# Patient Record
Sex: FEMALE | Race: WHITE | NOT HISPANIC OR LATINO | Employment: STUDENT | URBAN - METROPOLITAN AREA
[De-identification: names, ages, dates, MRNs, and addresses within clinical notes are randomized per-mention and may not be internally consistent; named-entity substitution may affect disease eponyms.]

---
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
## 2020-01-02 ENCOUNTER — PATIENT MESSAGE (OUTPATIENT)
Dept: OBSTETRICS AND GYNECOLOGY | Facility: CLINIC | Age: 20
End: 2020-01-02

## 2020-01-05 ENCOUNTER — PATIENT MESSAGE (OUTPATIENT)
Dept: HEMATOLOGY/ONCOLOGY | Facility: CLINIC | Age: 20
End: 2020-01-05

## 2020-01-07 ENCOUNTER — PATIENT MESSAGE (OUTPATIENT)
Dept: HEMATOLOGY/ONCOLOGY | Facility: CLINIC | Age: 20
End: 2020-01-07

## 2020-01-09 ENCOUNTER — PATIENT MESSAGE (OUTPATIENT)
Dept: OBSTETRICS AND GYNECOLOGY | Facility: CLINIC | Age: 20
End: 2020-01-09

## 2020-01-10 ENCOUNTER — TELEPHONE (OUTPATIENT)
Dept: OBSTETRICS AND GYNECOLOGY | Facility: CLINIC | Age: 20
End: 2020-01-10

## 2020-01-10 NOTE — TELEPHONE ENCOUNTER
Pt states she experienced a flow of blood last night and it's stopped without any notice of blood upon wake up. She has been advised to continue to monitor her symptoms and report to the ED if she begins to have a flow of blood with cramping. Pt verbalizes understanding. KATHRYN

## 2020-01-10 NOTE — TELEPHONE ENCOUNTER
----- Message from Nubia Luevano sent at 1/10/2020  7:56 AM CST -----  .Type:  Same Day Appointment Request    Caller is requesting a same day appointment.  Caller declined first available appointment listed below.    Name of Caller:SELF  When is the first available appointment?  Symptoms: LIGHT BLEEDING, MORE THAN USUAL  Best Call Back Number:.[phone] (home)   Additional Information: SPOKE WITH ON CALL LAST NIGHT & WAS TOLD SHE NEEDS TO SCHEDULE ULTRASOUND TODAY

## 2020-01-21 ENCOUNTER — LAB VISIT (OUTPATIENT)
Dept: LAB | Facility: HOSPITAL | Age: 20
End: 2020-01-21
Attending: INTERNAL MEDICINE
Payer: COMMERCIAL

## 2020-01-21 DIAGNOSIS — O99.281 THYROID DISEASE DURING PREGNANCY IN FIRST TRIMESTER: ICD-10-CM

## 2020-01-21 DIAGNOSIS — E07.9 THYROID DISEASE DURING PREGNANCY IN FIRST TRIMESTER: ICD-10-CM

## 2020-01-21 DIAGNOSIS — O99.011 ANEMIA AFFECTING PREGNANCY IN FIRST TRIMESTER: ICD-10-CM

## 2020-01-21 LAB
ALBUMIN SERPL BCP-MCNC: 3.9 G/DL (ref 3.5–5.2)
ALP SERPL-CCNC: 66 U/L (ref 55–135)
ALT SERPL W/O P-5'-P-CCNC: 21 U/L (ref 10–44)
ANION GAP SERPL CALC-SCNC: 10 MMOL/L (ref 8–16)
AST SERPL-CCNC: 19 U/L (ref 10–40)
BASOPHILS # BLD AUTO: 0.02 K/UL (ref 0–0.2)
BASOPHILS NFR BLD: 0.4 % (ref 0–1.9)
BILIRUB SERPL-MCNC: 0.5 MG/DL (ref 0.1–1)
BUN SERPL-MCNC: 9 MG/DL (ref 6–20)
CALCIUM SERPL-MCNC: 9.2 MG/DL (ref 8.7–10.5)
CHLORIDE SERPL-SCNC: 106 MMOL/L (ref 95–110)
CO2 SERPL-SCNC: 21 MMOL/L (ref 23–29)
CREAT SERPL-MCNC: 0.6 MG/DL (ref 0.5–1.4)
DIFFERENTIAL METHOD: ABNORMAL
EOSINOPHIL # BLD AUTO: 0 K/UL (ref 0–0.5)
EOSINOPHIL NFR BLD: 0 % (ref 0–8)
ERYTHROCYTE [DISTWIDTH] IN BLOOD BY AUTOMATED COUNT: 16.6 % (ref 11.5–14.5)
EST. GFR  (AFRICAN AMERICAN): >60 ML/MIN/1.73 M^2
EST. GFR  (NON AFRICAN AMERICAN): >60 ML/MIN/1.73 M^2
GLUCOSE SERPL-MCNC: 60 MG/DL (ref 70–110)
HCT VFR BLD AUTO: 33.9 % (ref 37–48.5)
HGB BLD-MCNC: 10.7 G/DL (ref 12–16)
IMM GRANULOCYTES # BLD AUTO: 0.02 K/UL (ref 0–0.04)
IMM GRANULOCYTES NFR BLD AUTO: 0.4 % (ref 0–0.5)
LYMPHOCYTES # BLD AUTO: 1.8 K/UL (ref 1–4.8)
LYMPHOCYTES NFR BLD: 40.2 % (ref 18–48)
MCH RBC QN AUTO: 28.5 PG (ref 27–31)
MCHC RBC AUTO-ENTMCNC: 31.6 G/DL (ref 32–36)
MCV RBC AUTO: 90 FL (ref 82–98)
MONOCYTES # BLD AUTO: 0.3 K/UL (ref 0.3–1)
MONOCYTES NFR BLD: 6.6 % (ref 4–15)
NEUTROPHILS # BLD AUTO: 2.4 K/UL (ref 1.8–7.7)
NEUTROPHILS NFR BLD: 52.4 % (ref 38–73)
NRBC BLD-RTO: 0 /100 WBC
PLATELET # BLD AUTO: 169 K/UL (ref 150–350)
PMV BLD AUTO: 8.9 FL (ref 9.2–12.9)
POTASSIUM SERPL-SCNC: 3.9 MMOL/L (ref 3.5–5.1)
PROT SERPL-MCNC: 7.5 G/DL (ref 6–8.4)
RBC # BLD AUTO: 3.76 M/UL (ref 4–5.4)
SODIUM SERPL-SCNC: 137 MMOL/L (ref 136–145)
T4 FREE SERPL-MCNC: 1.24 NG/DL (ref 0.71–1.51)
TSH SERPL DL<=0.005 MIU/L-ACNC: 5.65 UIU/ML (ref 0.4–4)
WBC # BLD AUTO: 4.53 K/UL (ref 3.9–12.7)

## 2020-01-21 PROCEDURE — 84436 ASSAY OF TOTAL THYROXINE: CPT

## 2020-01-21 PROCEDURE — 84443 ASSAY THYROID STIM HORMONE: CPT

## 2020-01-21 PROCEDURE — 80053 COMPREHEN METABOLIC PANEL: CPT

## 2020-01-21 PROCEDURE — 84480 ASSAY TRIIODOTHYRONINE (T3): CPT

## 2020-01-21 PROCEDURE — 84439 ASSAY OF FREE THYROXINE: CPT

## 2020-01-21 PROCEDURE — 82746 ASSAY OF FOLIC ACID SERUM: CPT

## 2020-01-21 PROCEDURE — 83540 ASSAY OF IRON: CPT

## 2020-01-21 PROCEDURE — 82728 ASSAY OF FERRITIN: CPT

## 2020-01-21 PROCEDURE — 82607 VITAMIN B-12: CPT

## 2020-01-21 PROCEDURE — 36415 COLL VENOUS BLD VENIPUNCTURE: CPT

## 2020-01-22 DIAGNOSIS — O99.281 THYROID DISEASE DURING PREGNANCY IN FIRST TRIMESTER: Primary | ICD-10-CM

## 2020-01-22 DIAGNOSIS — E07.9 THYROID DISEASE DURING PREGNANCY IN FIRST TRIMESTER: Primary | ICD-10-CM

## 2020-01-22 LAB
FERRITIN SERPL-MCNC: 92 NG/ML (ref 20–300)
FOLATE SERPL-MCNC: 16.5 NG/ML (ref 4–24)
IRON SERPL-MCNC: 57 UG/DL (ref 30–160)
SATURATED IRON: 17 % (ref 20–50)
T3 SERPL-MCNC: 87 NG/DL (ref 60–180)
T4 SERPL-MCNC: 13.9 UG/DL (ref 4.5–11.5)
TOTAL IRON BINDING CAPACITY: 333 UG/DL (ref 250–450)
TRANSFERRIN SERPL-MCNC: 225 MG/DL (ref 200–375)
VIT B12 SERPL-MCNC: 237 PG/ML (ref 210–950)

## 2020-02-10 ENCOUNTER — PATIENT MESSAGE (OUTPATIENT)
Dept: HEMATOLOGY/ONCOLOGY | Facility: CLINIC | Age: 20
End: 2020-02-10

## 2020-08-23 NOTE — PROGRESS NOTES
Subjective:       Patient ID: Lillie Starr is a 19 y.o. female.    Chief Complaint: Other iron deficiency anemia [D50.8]  HPI: We have an opportunity to see Ms. Lillie Starr in Hematology Oncology clinic at [facility] on 08/23/2020.  Ms. Lillie Starr is a 19 y.o. woman with Grave's disease s/p thyroidectomy, heavy menses and long term anemia previously on oral iron and could not tolerate.  Presents for evaluation of microcytic anemia.  Reports has MATHEWS.  Denies chest pain.  Denies any bleeding in stools and urine. Has been seen by Dr. Machado in gynecology and tried on OCP for menorrhagia but cannot tolerate.     She is s/p transfusion with 2 units RBC, and iv venofer.  Reports significantly improved energy.     Delivered healthy baby 3 weeks ago.  Has vaginal bleeding, managed by OBGYN.       Oncology History    No history exists.     Past Medical History:   Diagnosis Date    Anemia     Graves disease     Graves disease     Scoliosis      No family history on file.  Social History     Socioeconomic History    Marital status: Single     Spouse name: Not on file    Number of children: Not on file    Years of education: Not on file    Highest education level: Not on file   Occupational History    Not on file   Social Needs    Financial resource strain: Not on file    Food insecurity     Worry: Not on file     Inability: Not on file    Transportation needs     Medical: Not on file     Non-medical: Not on file   Tobacco Use    Smoking status: Never Smoker    Smokeless tobacco: Never Used   Substance and Sexual Activity    Alcohol use: No    Drug use: No    Sexual activity: Yes     Partners: Male   Lifestyle    Physical activity     Days per week: Not on file     Minutes per session: Not on file    Stress: Not on file   Relationships    Social connections     Talks on phone: Not on file     Gets together: Not on file     Attends [employer] service: Not on file      Active member of club or organization: Not on file     Attends meetings of clubs or organizations: Not on file     Relationship status: Not on file   Other Topics Concern    Not on file   Social History Narrative    Not on file     Past Surgical History:   Procedure Laterality Date    DILATION AND CURETTAGE OF UTERUS  03/19/2018    iron infusion      SCOLIOSIS REPAIR  05/19/2017    THYROID SURGERY      TYMPANOSTOMY TUBE PLACEMENT       Current Outpatient Medications   Medication Sig Dispense Refill    albuterol (PROAIR HFA) 90 mcg/actuation inhaler Inhale 2 puffs into the lungs every 4 (four) hours as needed for Shortness of Breath. Rescue 1 Inhaler 2    levothyroxine (SYNTHROID) 125 MCG tablet Take 1 tablet (125 mcg total) by mouth once daily. 30 tablet 1    levothyroxine (SYNTHROID, LEVOTHROID) 175 MCG tablet Take 1 tablet (175 mcg total) by mouth once daily. 90 tablet 3    prenatal vit,sharmila 74/iron/folic (PRENATAL VITAMIN 1+1 ORAL) Take by mouth.       No current facility-administered medications for this visit.        Labs:  Lab Results   Component Value Date    WBC 4.53 01/21/2020    HGB 10.7 (L) 01/21/2020    HCT 33.9 (L) 01/21/2020    MCV 90 01/21/2020     01/21/2020     BMP  Lab Results   Component Value Date     01/21/2020    K 3.9 01/21/2020     01/21/2020    CO2 21 (L) 01/21/2020    BUN 9 01/21/2020    CREATININE 0.6 01/21/2020    CALCIUM 9.2 01/21/2020    ANIONGAP 10 01/21/2020    ESTGFRAFRICA >60 01/21/2020    EGFRNONAA >60 01/21/2020     Lab Results   Component Value Date    ALT 21 01/21/2020    AST 19 01/21/2020    ALKPHOS 66 01/21/2020    BILITOT 0.5 01/21/2020       Lab Results   Component Value Date    IRON 57 01/21/2020    TIBC 333 01/21/2020    FERRITIN 92 01/21/2020     Lab Results   Component Value Date    EPVYGZBA23 237 01/21/2020     Lab Results   Component Value Date    FOLATE 16.5 01/21/2020     Lab Results   Component Value Date    TSH 5.649 (H) 01/21/2020        I have reviewed the radiology reports and examined the scan/xray images.    Review of Systems   Constitutional: Negative.    HENT: Negative.    Eyes: Negative.    Respiratory: Negative.    Cardiovascular: Negative.    Gastrointestinal: Negative.    Endocrine: Negative.    Genitourinary: Negative.    Musculoskeletal: Negative.    Skin: Negative.    Allergic/Immunologic: Negative.    Neurological: Negative.    Hematological: Negative.    Psychiatric/Behavioral: Negative.      ECOG SCORE              Objective:   There were no vitals filed for this visit.There is no height or weight on file to calculate BMI.  Physical Exam  Vitals signs and nursing note reviewed.   Constitutional:       Appearance: She is well-developed.   HENT:      Head: Normocephalic and atraumatic.   Eyes:      Conjunctiva/sclera: Conjunctivae normal.   Neck:      Musculoskeletal: Normal range of motion and neck supple.   Cardiovascular:      Rate and Rhythm: Normal rate and regular rhythm.   Pulmonary:      Effort: Pulmonary effort is normal.      Breath sounds: Normal breath sounds.   Abdominal:      General: Bowel sounds are normal.      Palpations: Abdomen is soft.   Musculoskeletal: Normal range of motion.   Skin:     General: Skin is warm and dry.   Neurological:      Mental Status: She is alert and oriented to person, place, and time.   Psychiatric:         Behavior: Behavior normal.         Thought Content: Thought content normal.         Judgment: Judgment normal.           Assessment:      1. Other iron deficiency anemia    2. Hypothyroid   3. Nutritional anemia           Plan:     Other iron deficiency anemia  Will give iv venofer if iron deficient    Hypothyroid  -     Ambulatory referral/consult to Endocrinology; Future; Expected date: 08/31/2020  -     TSH; Future; Expected date: 08/24/2020  -     T3, free; Future; Expected date: 08/24/2020  -     T4, free; Future; Expected date: 08/24/2020    Nutritional anemia  -     CBC auto  differential; Future; Expected date: 08/24/2020  -     Comprehensive metabolic panel; Future; Expected date: 08/24/2020  -     Ferritin; Future; Expected date: 08/24/2020  -     Iron and TIBC; Future; Expected date: 08/24/2020  -     Vitamin B12; Future; Expected date: 08/24/2020  -     Folate; Future; Expected date: 08/24/2020

## 2020-08-24 ENCOUNTER — OFFICE VISIT (OUTPATIENT)
Dept: HEMATOLOGY/ONCOLOGY | Facility: CLINIC | Age: 20
End: 2020-08-24
Payer: COMMERCIAL

## 2020-08-24 ENCOUNTER — LAB VISIT (OUTPATIENT)
Dept: LAB | Facility: HOSPITAL | Age: 20
End: 2020-08-24
Attending: INTERNAL MEDICINE
Payer: COMMERCIAL

## 2020-08-24 VITALS
BODY MASS INDEX: 21.19 KG/M2 | DIASTOLIC BLOOD PRESSURE: 69 MMHG | OXYGEN SATURATION: 98 % | SYSTOLIC BLOOD PRESSURE: 96 MMHG | WEIGHT: 127.19 LBS | HEIGHT: 65 IN | RESPIRATION RATE: 18 BRPM | TEMPERATURE: 97 F | HEART RATE: 110 BPM

## 2020-08-24 DIAGNOSIS — E07.9 THYROID DISEASE DURING PREGNANCY IN FIRST TRIMESTER: ICD-10-CM

## 2020-08-24 DIAGNOSIS — D53.9 NUTRITIONAL ANEMIA: ICD-10-CM

## 2020-08-24 DIAGNOSIS — D50.8 OTHER IRON DEFICIENCY ANEMIA: Primary | ICD-10-CM

## 2020-08-24 DIAGNOSIS — O99.281 THYROID DISEASE DURING PREGNANCY IN FIRST TRIMESTER: ICD-10-CM

## 2020-08-24 LAB
ALBUMIN SERPL BCP-MCNC: 4 G/DL (ref 3.5–5.2)
ALP SERPL-CCNC: 120 U/L (ref 55–135)
ALT SERPL W/O P-5'-P-CCNC: 17 U/L (ref 10–44)
ANION GAP SERPL CALC-SCNC: 10 MMOL/L (ref 8–16)
AST SERPL-CCNC: 19 U/L (ref 10–40)
BASOPHILS # BLD AUTO: 0.02 K/UL (ref 0–0.2)
BASOPHILS NFR BLD: 0.4 % (ref 0–1.9)
BILIRUB SERPL-MCNC: 0.4 MG/DL (ref 0.1–1)
BUN SERPL-MCNC: 10 MG/DL (ref 6–20)
CALCIUM SERPL-MCNC: 9 MG/DL (ref 8.7–10.5)
CHLORIDE SERPL-SCNC: 106 MMOL/L (ref 95–110)
CO2 SERPL-SCNC: 25 MMOL/L (ref 23–29)
CREAT SERPL-MCNC: 0.7 MG/DL (ref 0.5–1.4)
DIFFERENTIAL METHOD: ABNORMAL
EOSINOPHIL # BLD AUTO: 0 K/UL (ref 0–0.5)
EOSINOPHIL NFR BLD: 0.2 % (ref 0–8)
ERYTHROCYTE [DISTWIDTH] IN BLOOD BY AUTOMATED COUNT: 16.7 % (ref 11.5–14.5)
EST. GFR  (AFRICAN AMERICAN): >60 ML/MIN/1.73 M^2
EST. GFR  (NON AFRICAN AMERICAN): >60 ML/MIN/1.73 M^2
FERRITIN SERPL-MCNC: 12 NG/ML (ref 20–300)
GLUCOSE SERPL-MCNC: 71 MG/DL (ref 70–110)
HCT VFR BLD AUTO: 38.9 % (ref 37–48.5)
HGB BLD-MCNC: 11.4 G/DL (ref 12–16)
IMM GRANULOCYTES # BLD AUTO: 0.01 K/UL (ref 0–0.04)
IMM GRANULOCYTES NFR BLD AUTO: 0.2 % (ref 0–0.5)
IRON SERPL-MCNC: 34 UG/DL (ref 30–160)
LYMPHOCYTES # BLD AUTO: 1.7 K/UL (ref 1–4.8)
LYMPHOCYTES NFR BLD: 32.8 % (ref 18–48)
MCH RBC QN AUTO: 21.3 PG (ref 27–31)
MCHC RBC AUTO-ENTMCNC: 29.3 G/DL (ref 32–36)
MCV RBC AUTO: 73 FL (ref 82–98)
MONOCYTES # BLD AUTO: 0.4 K/UL (ref 0.3–1)
MONOCYTES NFR BLD: 7.1 % (ref 4–15)
NEUTROPHILS # BLD AUTO: 3 K/UL (ref 1.8–7.7)
NEUTROPHILS NFR BLD: 59.3 % (ref 38–73)
NRBC BLD-RTO: 0 /100 WBC
PLATELET # BLD AUTO: 315 K/UL (ref 150–350)
PMV BLD AUTO: 9.3 FL (ref 9.2–12.9)
POTASSIUM SERPL-SCNC: 4 MMOL/L (ref 3.5–5.1)
PROT SERPL-MCNC: 7.7 G/DL (ref 6–8.4)
RBC # BLD AUTO: 5.35 M/UL (ref 4–5.4)
SATURATED IRON: 6 % (ref 20–50)
SODIUM SERPL-SCNC: 141 MMOL/L (ref 136–145)
T4 FREE SERPL-MCNC: 1.73 NG/DL (ref 0.71–1.51)
TOTAL IRON BINDING CAPACITY: 564 UG/DL (ref 250–450)
TRANSFERRIN SERPL-MCNC: 381 MG/DL (ref 200–375)
TSH SERPL DL<=0.005 MIU/L-ACNC: 0.03 UIU/ML (ref 0.4–4)
WBC # BLD AUTO: 5.09 K/UL (ref 3.9–12.7)

## 2020-08-24 PROCEDURE — 82607 VITAMIN B-12: CPT

## 2020-08-24 PROCEDURE — 99214 PR OFFICE/OUTPT VISIT, EST, LEVL IV, 30-39 MIN: ICD-10-PCS | Mod: S$GLB,,, | Performed by: INTERNAL MEDICINE

## 2020-08-24 PROCEDURE — 84439 ASSAY OF FREE THYROXINE: CPT

## 2020-08-24 PROCEDURE — 85025 COMPLETE CBC W/AUTO DIFF WBC: CPT

## 2020-08-24 PROCEDURE — 80053 COMPREHEN METABOLIC PANEL: CPT

## 2020-08-24 PROCEDURE — 83540 ASSAY OF IRON: CPT

## 2020-08-24 PROCEDURE — 3008F BODY MASS INDEX DOCD: CPT | Mod: CPTII,S$GLB,, | Performed by: INTERNAL MEDICINE

## 2020-08-24 PROCEDURE — 82728 ASSAY OF FERRITIN: CPT

## 2020-08-24 PROCEDURE — 99999 PR PBB SHADOW E&M-EST. PATIENT-LVL III: CPT | Mod: PBBFAC,,, | Performed by: INTERNAL MEDICINE

## 2020-08-24 PROCEDURE — 36415 COLL VENOUS BLD VENIPUNCTURE: CPT

## 2020-08-24 PROCEDURE — 99999 PR PBB SHADOW E&M-EST. PATIENT-LVL III: ICD-10-PCS | Mod: PBBFAC,,, | Performed by: INTERNAL MEDICINE

## 2020-08-24 PROCEDURE — 84443 ASSAY THYROID STIM HORMONE: CPT

## 2020-08-24 PROCEDURE — 99214 OFFICE O/P EST MOD 30 MIN: CPT | Mod: S$GLB,,, | Performed by: INTERNAL MEDICINE

## 2020-08-24 PROCEDURE — 84481 FREE ASSAY (FT-3): CPT

## 2020-08-24 PROCEDURE — 3008F PR BODY MASS INDEX (BMI) DOCUMENTED: ICD-10-PCS | Mod: CPTII,S$GLB,, | Performed by: INTERNAL MEDICINE

## 2020-08-24 PROCEDURE — 82746 ASSAY OF FOLIC ACID SERUM: CPT

## 2020-08-25 LAB
FOLATE SERPL-MCNC: 17 NG/ML (ref 4–24)
T3FREE SERPL-MCNC: 4.6 PG/ML (ref 2.3–4.2)
VIT B12 SERPL-MCNC: 402 PG/ML (ref 210–950)

## 2020-08-25 RX ORDER — SODIUM CHLORIDE 0.9 % (FLUSH) 0.9 %
10 SYRINGE (ML) INJECTION
Status: CANCELLED | OUTPATIENT
Start: 2020-10-24

## 2020-08-25 RX ORDER — HEPARIN 100 UNIT/ML
500 SYRINGE INTRAVENOUS
Status: CANCELLED | OUTPATIENT
Start: 2020-10-03

## 2020-08-25 RX ORDER — SODIUM CHLORIDE 0.9 % (FLUSH) 0.9 %
10 SYRINGE (ML) INJECTION
Status: CANCELLED | OUTPATIENT
Start: 2020-09-12

## 2020-08-25 RX ORDER — HEPARIN 100 UNIT/ML
500 SYRINGE INTRAVENOUS
Status: CANCELLED | OUTPATIENT
Start: 2020-10-10

## 2020-08-25 RX ORDER — HEPARIN 100 UNIT/ML
500 SYRINGE INTRAVENOUS
Status: CANCELLED | OUTPATIENT
Start: 2020-09-26

## 2020-08-25 RX ORDER — HEPARIN 100 UNIT/ML
500 SYRINGE INTRAVENOUS
Status: CANCELLED | OUTPATIENT
Start: 2020-09-12

## 2020-08-25 RX ORDER — SODIUM CHLORIDE 0.9 % (FLUSH) 0.9 %
10 SYRINGE (ML) INJECTION
Status: CANCELLED | OUTPATIENT
Start: 2020-09-26

## 2020-08-25 RX ORDER — HEPARIN 100 UNIT/ML
500 SYRINGE INTRAVENOUS
Status: CANCELLED | OUTPATIENT
Start: 2020-10-17

## 2020-08-25 RX ORDER — HEPARIN 100 UNIT/ML
500 SYRINGE INTRAVENOUS
Status: CANCELLED | OUTPATIENT
Start: 2020-09-19

## 2020-08-25 RX ORDER — SODIUM CHLORIDE 0.9 % (FLUSH) 0.9 %
10 SYRINGE (ML) INJECTION
Status: CANCELLED | OUTPATIENT
Start: 2020-10-03

## 2020-08-25 RX ORDER — SODIUM CHLORIDE 0.9 % (FLUSH) 0.9 %
10 SYRINGE (ML) INJECTION
Status: CANCELLED | OUTPATIENT
Start: 2020-10-17

## 2020-08-25 RX ORDER — SODIUM CHLORIDE 0.9 % (FLUSH) 0.9 %
10 SYRINGE (ML) INJECTION
Status: CANCELLED | OUTPATIENT
Start: 2020-10-10

## 2020-08-25 RX ORDER — HEPARIN 100 UNIT/ML
500 SYRINGE INTRAVENOUS
Status: CANCELLED | OUTPATIENT
Start: 2020-10-24

## 2020-08-25 RX ORDER — HEPARIN 100 UNIT/ML
500 SYRINGE INTRAVENOUS
Status: CANCELLED | OUTPATIENT
Start: 2020-08-25

## 2020-08-25 RX ORDER — SODIUM CHLORIDE 0.9 % (FLUSH) 0.9 %
10 SYRINGE (ML) INJECTION
Status: CANCELLED | OUTPATIENT
Start: 2020-08-25

## 2020-08-25 RX ORDER — SODIUM CHLORIDE 0.9 % (FLUSH) 0.9 %
10 SYRINGE (ML) INJECTION
Status: CANCELLED | OUTPATIENT
Start: 2020-09-19

## 2020-09-11 ENCOUNTER — INFUSION (OUTPATIENT)
Dept: INFUSION THERAPY | Facility: HOSPITAL | Age: 20
End: 2020-09-11
Attending: INTERNAL MEDICINE
Payer: COMMERCIAL

## 2020-09-11 VITALS
BODY MASS INDEX: 21.12 KG/M2 | RESPIRATION RATE: 16 BRPM | HEIGHT: 65 IN | DIASTOLIC BLOOD PRESSURE: 64 MMHG | SYSTOLIC BLOOD PRESSURE: 99 MMHG | HEART RATE: 77 BPM | TEMPERATURE: 97 F | OXYGEN SATURATION: 100 % | WEIGHT: 126.75 LBS

## 2020-09-11 DIAGNOSIS — D50.8 IRON DEFICIENCY ANEMIA SECONDARY TO INADEQUATE DIETARY IRON INTAKE: Primary | ICD-10-CM

## 2020-09-11 PROCEDURE — 25000003 PHARM REV CODE 250: Performed by: INTERNAL MEDICINE

## 2020-09-11 PROCEDURE — 96365 THER/PROPH/DIAG IV INF INIT: CPT

## 2020-09-11 PROCEDURE — 63600175 PHARM REV CODE 636 W HCPCS: Performed by: INTERNAL MEDICINE

## 2020-09-11 RX ORDER — SODIUM CHLORIDE 0.9 % (FLUSH) 0.9 %
10 SYRINGE (ML) INJECTION
Status: DISCONTINUED | OUTPATIENT
Start: 2020-09-11 | End: 2020-09-11 | Stop reason: HOSPADM

## 2020-09-11 RX ADMIN — SODIUM CHLORIDE: 0.9 INJECTION, SOLUTION INTRAVENOUS at 01:09

## 2020-09-11 RX ADMIN — SODIUM CHLORIDE: 9 INJECTION, SOLUTION INTRAVENOUS at 01:09

## 2020-09-11 NOTE — DISCHARGE INSTRUCTIONS
[facility]  [address] [facility]  [address]  [phone] phone     [phone] fax  Hours of Operation: Monday- Friday 8:00am- 5:00pm  After hours phone  [phone]  Hematology / Oncology Physicians on call      KAZ [address] Dr[address] Dr., Dr., Dr., NP Sydney Prescott, NP Tyesha Taylor, NP    Please call with any concerns regarding your appointment today.  IRON DEFICIENCY ANEMIA:  Anemia is a condition where the size or number of red blood cells in the body is reduced. Iron is needed in the diet to make red cells. The red blood cells carry oxygen to all parts of the body. Anemia limits the delivery of oxygen to where it is needed. This causes a feeling of being tired and run down. When anemia becomes severe, the skin becomes pale and there is shortness of breath with exertion, headaches, dizziness, drowsiness and fatigue.  The cause of your anemia is lack of iron in your body. This may occur due to blood loss (for example, heavy menstrual periods or bleeding from the stomach or intestines) or a poor diet (not eating enough iron-containing foods), inability to absorb iron from your diet, or pregnancy.  If the blood count is low enough, an IRON SUPPLEMENT will be prescribed. It usually takes about 2-3 months of treatment with iron supplements to correct an anemia. Severe cases of anemia requires a blood transfusion to rapidly correct symptoms and deliver more oxygen to the cells.  HOME CARE:  1) Increase the iron stores in your body by eating foods high in iron content. This is a natural way of building your blood cells back up again. Beef, liver, spinach and other dark green leafy vegetables, whole grain products, beans and nuts are all natural sources of iron.  2) If you are having symptoms of anemia listed above:  -- Do not overexert yourself.  -- Talk to your doctor before flying on an airplane or  travelling to high altitudes.  FOLLOW UP with your doctor in 2 months for a repeat red blood cell count, or as recommended by our staff, to be sure that the anemia has been corrected.  GET PROMPT MEDICAL ATTENTION if any of the following occur or worsen:  -- Shortness of breath or chest pain  -- Dizziness or fainting  -- Vomiting blood or passing red or black-colored stool  © 6091-1798 [employer], [address] [place] [address], [place] [address]. All rights reserved. This information is not intended as a substitute for professional medical care. Always follow your healthcare professional's instructions.  WAYS TO HELP PREVENT INFECTION         WASH YOUR HANDS OFTEN DURING THE DAY, ESPECIALLY BEFORE YOU EAT, AFTER USING THE BATHROOM, AND AFTER TOUCHING ANIMALS     STAY AWAY FROM PEOPLE WHO HAVE ILLNESSES YOU CAN CATCH; SUCH AS COLDS, FLU, CHICKEN POX     TRY TO AVOID CROWDS     STAY AWAY FROM CHILDREN WHO RECENTLY HAVE RECEIVED LIVE VIRUS VACCINES     MAINTAIN GOOD MOUTH CARE     DO NOT SQUEEZE OR SCRATCH PIMPLES     CLEAN CUTS & SCRAPES RIGHT AWAY AND DAILY UNTIL HEALED WITH WARM WATER, SOAP & AN ANTISEPTIC     AVOID CONTACT WITH LITTER BOXES, BIRD CAGES, & FISH TANKS     AVOID STANDING WATER, IE., BIRD BATHS, FLOWER POTS/VASES, OR HUMIDIFIERS     WEAR GLOVES WHEN GARDENING OR CLEANING UP AFTER OTHERS, ESPECIALLY BABIES & SMALL CHILDREN     DO NOT EAT RAW FISH, SEAFOOD, MEAT, OR EGGS  FALL PREVENTION   Falls often occur due to slipping, tripping or losing your balance. Here are ways to reduce your risk of falling again.   Was there anything that caused your fall that can be fixed, removed or replaced?   Make your home safe by keeping walkways clear of objects you may trip over.   Use non-slip pads under rugs.   Do not walk in poorly lit areas.   Do not stand on chairs or wobbly ladders.   Use caution when reaching overhead or looking upward. This position can cause a loss of balance.   Be sure your shoes  fit properly, have non-slip bottoms and are in good condition.   Be cautious when going up and down stairs, curbs, and when walking on uneven sidewalks.   If your balance is poor, consider using a cane or walker.   If your fall was related to alcohol use, stop or limit alcohol intake.   If your fall was related to use of sleeping medicines, talk to your doctor about this. You may need to reduce your dosage at bedtime if you awaken during the night to go to the bathroom.   To reduce the need for nighttime bathroom trips:   Avoid drinking fluids for several hours before going to bed   Empty your bladder before going to bed   Men can keep a urinal at the bedside   © 8760-3042 [facility], [address] [place] [address], [place] [address]. All rights reserved. This information is not intended as a substitute for professional medical care. Always follow your healthcare professional's instructions.

## 2020-09-11 NOTE — NURSING
Venofer ran for an hour - unable to chart on the mar.  Patient tolerate treatment today without adverse reaction. Follow up appointments made.

## 2020-09-11 NOTE — PLAN OF CARE
Problem: Adult Inpatient Plan of Care  Goal: Plan of Care Review  Outcome: Ongoing, Progressing  Goal: Patient-Specific Goal (Individualization)  Outcome: Ongoing, Progressing  Flowsheets (Taken 9/11/2020 1330)  Individualized Care Needs: feet elevated, warm blanket, and apple juice - request dairy free snacks  Anxieties, Fears or Concerns: none voiced  Patient-Specific Goals (Include Timeframe): To tolerate iron today  Goal: Absence of Hospital-Acquired Illness or Injury  Outcome: Ongoing, Progressing  Goal: Optimal Comfort and Wellbeing  Outcome: Ongoing, Progressing  Goal: Readiness for Transition of Care  Outcome: Ongoing, Progressing  Goal: Rounds/Family Conference  Outcome: Ongoing, Progressing     Problem: Anemia  Goal: Anemia Symptom Improvement  Outcome: Ongoing, Progressing   Patient reports, feeling tired today

## 2020-09-17 ENCOUNTER — INFUSION (OUTPATIENT)
Dept: INFUSION THERAPY | Facility: HOSPITAL | Age: 20
End: 2020-09-17
Attending: INTERNAL MEDICINE
Payer: COMMERCIAL

## 2020-09-17 VITALS
HEART RATE: 100 BPM | TEMPERATURE: 98 F | SYSTOLIC BLOOD PRESSURE: 98 MMHG | RESPIRATION RATE: 16 BRPM | DIASTOLIC BLOOD PRESSURE: 80 MMHG | OXYGEN SATURATION: 100 %

## 2020-09-17 DIAGNOSIS — D50.8 IRON DEFICIENCY ANEMIA SECONDARY TO INADEQUATE DIETARY IRON INTAKE: Primary | ICD-10-CM

## 2020-09-17 PROCEDURE — 63600175 PHARM REV CODE 636 W HCPCS: Performed by: INTERNAL MEDICINE

## 2020-09-17 PROCEDURE — 25000003 PHARM REV CODE 250: Performed by: INTERNAL MEDICINE

## 2020-09-17 PROCEDURE — 96365 THER/PROPH/DIAG IV INF INIT: CPT

## 2020-09-17 RX ADMIN — SODIUM CHLORIDE: 9 INJECTION, SOLUTION INTRAVENOUS at 02:09

## 2020-09-24 ENCOUNTER — OFFICE VISIT (OUTPATIENT)
Dept: ENDOCRINOLOGY | Facility: CLINIC | Age: 20
End: 2020-09-24
Payer: COMMERCIAL

## 2020-09-24 VITALS
RESPIRATION RATE: 16 BRPM | WEIGHT: 125.69 LBS | HEART RATE: 86 BPM | BODY MASS INDEX: 20.94 KG/M2 | DIASTOLIC BLOOD PRESSURE: 62 MMHG | SYSTOLIC BLOOD PRESSURE: 94 MMHG | HEIGHT: 65 IN

## 2020-09-24 DIAGNOSIS — E03.9 HYPOTHYROIDISM (ACQUIRED): ICD-10-CM

## 2020-09-24 DIAGNOSIS — O99.281 THYROID DISEASE DURING PREGNANCY IN FIRST TRIMESTER: ICD-10-CM

## 2020-09-24 DIAGNOSIS — E05.90 HYPERTHYROIDISM: Primary | ICD-10-CM

## 2020-09-24 DIAGNOSIS — E07.9 THYROID DISEASE DURING PREGNANCY IN FIRST TRIMESTER: ICD-10-CM

## 2020-09-24 DIAGNOSIS — R94.6 ABNORMAL THYROID FUNCTION TEST: ICD-10-CM

## 2020-09-24 PROCEDURE — 99204 PR OFFICE/OUTPT VISIT, NEW, LEVL IV, 45-59 MIN: ICD-10-PCS | Mod: S$GLB,,, | Performed by: INTERNAL MEDICINE

## 2020-09-24 PROCEDURE — 99999 PR PBB SHADOW E&M-EST. PATIENT-LVL III: CPT | Mod: PBBFAC,,, | Performed by: INTERNAL MEDICINE

## 2020-09-24 PROCEDURE — 99204 OFFICE O/P NEW MOD 45 MIN: CPT | Mod: S$GLB,,, | Performed by: INTERNAL MEDICINE

## 2020-09-24 PROCEDURE — 99999 PR PBB SHADOW E&M-EST. PATIENT-LVL III: ICD-10-PCS | Mod: PBBFAC,,, | Performed by: INTERNAL MEDICINE

## 2020-09-24 PROCEDURE — 3008F PR BODY MASS INDEX (BMI) DOCUMENTED: ICD-10-PCS | Mod: CPTII,S$GLB,, | Performed by: INTERNAL MEDICINE

## 2020-09-24 PROCEDURE — 3008F BODY MASS INDEX DOCD: CPT | Mod: CPTII,S$GLB,, | Performed by: INTERNAL MEDICINE

## 2020-09-24 RX ORDER — LEVOTHYROXINE SODIUM 150 UG/1
150 TABLET ORAL
Qty: 30 TABLET | Refills: 1 | Status: SHIPPED | OUTPATIENT
Start: 2020-09-24 | End: 2020-12-23 | Stop reason: SDUPTHER

## 2020-09-24 NOTE — LETTER
September 24, 2020      Bryant Liz MD  [address]  [place] [address]           [facility]  [address]  [place] [address]  Phone: [phone]  Fax: [phone]          Patient: Lillie Starr   MR Number: [id number]   YOB: 2000   Date of Visit: 9/24/2020       Dear Dr. Bryant Liz:    Thank you for referring Lillie Starr to me for evaluation. Attached you will find relevant portions of my assessment and plan of care.    If you have questions, please do not hesitate to call me. I look forward to following Lillie Starr along with you.    Sincerely,    Leoncio Decker MD    Enclosure  CC:  No Recipients    If you would like to receive this communication electronically, please contact [email or web address] or [phone] to request more information on [employer] access.    For providers and/or their staff who would like to refer a patient to [facility], please contact us through our one-stop-shop provider referral line, [facility], at [phone].    If you feel you have received this communication in error or would no longer like to receive these types of communications, please e-mail [email or web address]

## 2020-09-24 NOTE — PROGRESS NOTES
Referring Provider:  Bryant Liz MD    PCP:  Rafael Palmer MD    Reason for referral:   Thyroid disease during pregnancy in 1st trimester    CC:  Abnormal thyroid test    HPI:  Lillie Starr 20 y.o. female  Patient had abnormal thyroid function test.    7 w postpartum; nursing her baby  Feeling good, just tired  Thyroid problem diagnosed in 2016 June 2018 thyroidectomy  On Synthroid 175 mcg daily x 1 week, after taking 200 mcg during pregnancy per pt  Was on 200 mcg during pregnancy  Was on 125 mcg prior to pregnancy    Pt was treated by Dr. Pan, a pediatric endocrinologist, in the past    Single  In Nursing school  Living w mom and step father and sister      Past Medical History:   Diagnosis Date    Anemia     Graves disease     Graves disease     Scoliosis        Past Surgical History:   Procedure Laterality Date    DILATION AND CURETTAGE OF UTERUS  03/19/2018    iron infusion      SCOLIOSIS REPAIR  05/19/2017    THYROID SURGERY      TYMPANOSTOMY TUBE PLACEMENT         Social History     Socioeconomic History    Marital status: Single     Spouse name: Not on file    Number of children: Not on file    Years of education: Not on file    Highest education level: Not on file   Occupational History    Not on file   Social Needs    Financial resource strain: Not on file    Food insecurity     Worry: Not on file     Inability: Not on file    Transportation needs     Medical: Not on file     Non-medical: Not on file   Tobacco Use    Smoking status: Never Smoker    Smokeless tobacco: Never Used   Substance and Sexual Activity    Alcohol use: No    Drug use: No    Sexual activity: Yes     Partners: Male   Lifestyle    Physical activity     Days per week: Not on file     Minutes per session: Not on file    Stress: Not on file   Relationships    Social connections     Talks on phone: Not on file     Gets together: Not on file     Attends Yazidi service: Not on file      Active member of club or organization: Not on file     Attends meetings of clubs or organizations: Not on file     Relationship status: Not on file   Other Topics Concern    Not on file   Social History Narrative    Not on file         ROS:   No complaints of chest pain shortness breath  No complaints of nausea or vomiting  ROS otherwise neg except for what is mentioned in the PMH, PSH and HPI    PE:  Vitals:    09/24/20 1503   BP: 94/62   Pulse: 86   Resp: 16     Alert and oriented  No acute distress  No acne  No Proptosis or conjunctivitis  No rash on tongue, + teeth  No goitre by inspection  Thyroid gland is not palpable  No thyroid bruit  A surgical scar in the neck  No cervical lymphadenopathy  Heart reg, no gallop  Lungs cta, no wheezing  Abd soft, no tnd  No edema in lower legs  No rash  No bruises  Speech normal  Behavior normal  No tremor  No obesity  Body mass index is 20.91 kg/m².      Lab:    Lab Results   Component Value Date    TSH 0.025 (L) 08/24/2020    H8GKWSR 87 01/21/2020    A8FBETZ 13.9 (H) 01/21/2020    FREET4 1.73 (H) 08/24/2020       Lab Results   Component Value Date     08/24/2020    K 4.0 08/24/2020     08/24/2020    CO2 25 08/24/2020    BUN 10 08/24/2020    CREATININE 0.7 08/24/2020    CALCIUM 9.0 08/24/2020    ANIONGAP 10 08/24/2020    ESTGFRAFRICA >60.0 08/24/2020    EGFRNONAA >60.0 08/24/2020     Lab Results   Component Value Date    CREATRANDUR 141.0 08/08/2019       A/P:  Hyperthyroidism postpartum  History of Graves disease treated by thyroidectomy  Postsurgical hypothyroidism  -     TSH; Future; Expected date: 09/24/2020  -     T4, free; Future; Expected date: 09/24/2020  Hypothyroidism (acquired)  -     TSH; Future; Expected date: 09/24/2020  -     T4, free; Future; Expected date: 09/24/2020    Abnormal thyroid function test    Levothyroxine dose to be changed to 150 mcg daily  Blood test to be done just before next visit in 1 month  -     levothyroxine (SYNTHROID)  150 MCG tablet; Take 1 tablet (150 mcg total) by mouth before breakfast.  Dispense: 30 tablet; Refill: 1    Pt understands the plan and instructions.

## 2020-09-25 ENCOUNTER — INFUSION (OUTPATIENT)
Dept: INFUSION THERAPY | Facility: HOSPITAL | Age: 20
End: 2020-09-25
Attending: INTERNAL MEDICINE
Payer: COMMERCIAL

## 2020-09-25 VITALS
HEART RATE: 81 BPM | OXYGEN SATURATION: 98 % | DIASTOLIC BLOOD PRESSURE: 59 MMHG | RESPIRATION RATE: 18 BRPM | TEMPERATURE: 98 F | SYSTOLIC BLOOD PRESSURE: 98 MMHG

## 2020-09-25 DIAGNOSIS — D50.8 IRON DEFICIENCY ANEMIA SECONDARY TO INADEQUATE DIETARY IRON INTAKE: Primary | ICD-10-CM

## 2020-09-25 PROCEDURE — 63600175 PHARM REV CODE 636 W HCPCS: Performed by: INTERNAL MEDICINE

## 2020-09-25 PROCEDURE — 96374 THER/PROPH/DIAG INJ IV PUSH: CPT

## 2020-09-25 PROCEDURE — 25000003 PHARM REV CODE 250: Performed by: INTERNAL MEDICINE

## 2020-09-25 RX ORDER — SODIUM CHLORIDE 0.9 % (FLUSH) 0.9 %
10 SYRINGE (ML) INJECTION
Status: DISCONTINUED | OUTPATIENT
Start: 2020-09-25 | End: 2020-09-25 | Stop reason: HOSPADM

## 2020-09-25 RX ORDER — HEPARIN 100 UNIT/ML
500 SYRINGE INTRAVENOUS
Status: DISCONTINUED | OUTPATIENT
Start: 2020-09-25 | End: 2020-09-25 | Stop reason: HOSPADM

## 2020-09-25 RX ADMIN — SODIUM CHLORIDE: 0.9 INJECTION, SOLUTION INTRAVENOUS at 03:09

## 2020-09-25 RX ADMIN — IRON SUCROSE 200 MG: 20 INJECTION, SOLUTION INTRAVENOUS at 03:09

## 2020-09-25 NOTE — PLAN OF CARE
Problem: Anemia  Goal: Anemia Symptom Improvement  Outcome: Ongoing, Progressing   Pt reported feeling weak and tired.

## 2020-10-07 ENCOUNTER — PATIENT MESSAGE (OUTPATIENT)
Dept: INFUSION THERAPY | Facility: HOSPITAL | Age: 20
End: 2020-10-07

## 2020-10-26 ENCOUNTER — TELEPHONE (OUTPATIENT)
Dept: ENDOCRINOLOGY | Facility: CLINIC | Age: 20
End: 2020-10-26

## 2020-10-26 ENCOUNTER — PATIENT MESSAGE (OUTPATIENT)
Dept: ENDOCRINOLOGY | Facility: CLINIC | Age: 20
End: 2020-10-26

## 2020-10-28 DIAGNOSIS — D50.8 OTHER IRON DEFICIENCY ANEMIA: Primary | ICD-10-CM

## 2020-10-28 DIAGNOSIS — D53.9 NUTRITIONAL ANEMIA: ICD-10-CM

## 2020-10-29 ENCOUNTER — TELEPHONE (OUTPATIENT)
Dept: HEMATOLOGY/ONCOLOGY | Facility: CLINIC | Age: 20
End: 2020-10-29

## 2020-10-29 NOTE — TELEPHONE ENCOUNTER
Attempted to contact pt regarding rescheduling her appt with Dr. Liz today until after she has completed iron infusions and had labs drawn per Dr. Liz. Unabl to reach  full. Unable to leave message. Appt rescheduled.

## 2020-12-03 ENCOUNTER — PATIENT MESSAGE (OUTPATIENT)
Dept: HEMATOLOGY/ONCOLOGY | Facility: CLINIC | Age: 20
End: 2020-12-03

## 2020-12-09 ENCOUNTER — PATIENT MESSAGE (OUTPATIENT)
Dept: HEMATOLOGY/ONCOLOGY | Facility: CLINIC | Age: 20
End: 2020-12-09

## 2020-12-23 ENCOUNTER — PATIENT MESSAGE (OUTPATIENT)
Dept: ENDOCRINOLOGY | Facility: CLINIC | Age: 20
End: 2020-12-23

## 2020-12-23 RX ORDER — LEVOTHYROXINE SODIUM 150 UG/1
150 TABLET ORAL
Qty: 30 TABLET | Refills: 1 | Status: SHIPPED | OUTPATIENT
Start: 2020-12-23

## 2021-03-18 PROCEDURE — 99284 EMERGENCY DEPT VISIT MOD MDM: CPT | Mod: 25

## 2021-03-18 PROCEDURE — 96372 THER/PROPH/DIAG INJ SC/IM: CPT

## 2021-03-19 ENCOUNTER — HOSPITAL ENCOUNTER (EMERGENCY)
Facility: HOSPITAL | Age: 21
Discharge: HOME | End: 2021-03-19
Attending: EMERGENCY MEDICINE
Payer: COMMERCIAL

## 2021-03-19 VITALS
HEART RATE: 99 BPM | DIASTOLIC BLOOD PRESSURE: 80 MMHG | RESPIRATION RATE: 16 BRPM | TEMPERATURE: 99 F | WEIGHT: 128 LBS | BODY MASS INDEX: 21.3 KG/M2 | OXYGEN SATURATION: 100 % | SYSTOLIC BLOOD PRESSURE: 122 MMHG

## 2021-03-19 DIAGNOSIS — L02.31 ABSCESS OF LEFT BUTTOCK: Primary | ICD-10-CM

## 2021-03-19 PROCEDURE — 63600175 PHARM REV CODE 636 W HCPCS: Performed by: NURSE PRACTITIONER

## 2021-03-19 RX ORDER — DOXYCYCLINE 100 MG/1
100 CAPSULE ORAL 2 TIMES DAILY
Qty: 20 CAPSULE | Refills: 0 | Status: SHIPPED | OUTPATIENT
Start: 2021-03-19 | End: 2021-03-29

## 2021-03-19 RX ORDER — DICLOFENAC SODIUM 50 MG/1
50 TABLET, DELAYED RELEASE ORAL 3 TIMES DAILY PRN
Qty: 15 TABLET | Refills: 0 | Status: SHIPPED | OUTPATIENT
Start: 2021-03-19 | End: 2022-06-21

## 2021-03-19 RX ORDER — KETOROLAC TROMETHAMINE 30 MG/ML
60 INJECTION, SOLUTION INTRAMUSCULAR; INTRAVENOUS
Status: COMPLETED | OUTPATIENT
Start: 2021-03-19 | End: 2021-03-19

## 2021-03-19 RX ORDER — TRAMADOL HYDROCHLORIDE 50 MG/1
50 TABLET ORAL EVERY 12 HOURS PRN
Qty: 6 TABLET | Refills: 0 | Status: SHIPPED | OUTPATIENT
Start: 2021-03-19 | End: 2021-03-22

## 2021-03-19 RX ORDER — CEFTRIAXONE 1 G/1
1 INJECTION, POWDER, FOR SOLUTION INTRAMUSCULAR; INTRAVENOUS
Status: COMPLETED | OUTPATIENT
Start: 2021-03-19 | End: 2021-03-19

## 2021-03-19 RX ADMIN — CEFTRIAXONE 1 G: 1 INJECTION, POWDER, FOR SOLUTION INTRAMUSCULAR; INTRAVENOUS at 12:03

## 2021-03-19 RX ADMIN — KETOROLAC TROMETHAMINE 60 MG: 30 INJECTION, SOLUTION INTRAMUSCULAR at 12:03

## 2021-11-20 NOTE — TELEPHONE ENCOUNTER
----- Message from Catrachita Cohen sent at 4/9/2018  4:13 PM CDT -----  Contact: Roby/pt mom  Call caller regarding if pt was suppose to stop taking estradio pills after surgery. Caller states that pt stopped and now she bleeding again.     ..[phone]  
Spoke with Juan, pts mother. States Moreira has stopped taking the estadiol and has started bleeding again. Advised Juan that this was normal and to give it time per Dr. Machado. Verbalized understanding.   
148

## 2022-06-21 ENCOUNTER — OFFICE VISIT (OUTPATIENT)
Dept: OBSTETRICS AND GYNECOLOGY | Facility: CLINIC | Age: 22
End: 2022-06-21
Payer: COMMERCIAL

## 2022-06-21 ENCOUNTER — LAB VISIT (OUTPATIENT)
Dept: LAB | Facility: HOSPITAL | Age: 22
End: 2022-06-21
Attending: NURSE PRACTITIONER
Payer: COMMERCIAL

## 2022-06-21 VITALS — SYSTOLIC BLOOD PRESSURE: 110 MMHG | BODY MASS INDEX: 20.44 KG/M2 | DIASTOLIC BLOOD PRESSURE: 66 MMHG | WEIGHT: 130.5 LBS

## 2022-06-21 DIAGNOSIS — Z11.3 SCREEN FOR STD (SEXUALLY TRANSMITTED DISEASE): ICD-10-CM

## 2022-06-21 DIAGNOSIS — Z01.419 WELL WOMAN EXAM WITH ROUTINE GYNECOLOGICAL EXAM: Primary | ICD-10-CM

## 2022-06-21 DIAGNOSIS — Z12.4 ENCOUNTER FOR SCREENING FOR CERVICAL CANCER: ICD-10-CM

## 2022-06-21 DIAGNOSIS — R10.2 PELVIC PAIN: ICD-10-CM

## 2022-06-21 PROCEDURE — 86592 SYPHILIS TEST NON-TREP QUAL: CPT | Performed by: NURSE PRACTITIONER

## 2022-06-21 PROCEDURE — 3074F SYST BP LT 130 MM HG: CPT | Mod: CPTII,S$GLB,, | Performed by: NURSE PRACTITIONER

## 2022-06-21 PROCEDURE — 99999 PR PBB SHADOW E&M-EST. PATIENT-LVL III: CPT | Mod: PBBFAC,,, | Performed by: NURSE PRACTITIONER

## 2022-06-21 PROCEDURE — 99999 PR PBB SHADOW E&M-EST. PATIENT-LVL III: ICD-10-PCS | Mod: PBBFAC,,, | Performed by: NURSE PRACTITIONER

## 2022-06-21 PROCEDURE — 88175 CYTOPATH C/V AUTO FLUID REDO: CPT | Performed by: NURSE PRACTITIONER

## 2022-06-21 PROCEDURE — 87481 CANDIDA DNA AMP PROBE: CPT | Mod: 59 | Performed by: NURSE PRACTITIONER

## 2022-06-21 PROCEDURE — 1160F PR REVIEW ALL MEDS BY PRESCRIBER/CLIN PHARMACIST DOCUMENTED: ICD-10-PCS | Mod: CPTII,S$GLB,, | Performed by: NURSE PRACTITIONER

## 2022-06-21 PROCEDURE — 3008F PR BODY MASS INDEX (BMI) DOCUMENTED: ICD-10-PCS | Mod: CPTII,S$GLB,, | Performed by: NURSE PRACTITIONER

## 2022-06-21 PROCEDURE — 1159F MED LIST DOCD IN RCRD: CPT | Mod: CPTII,S$GLB,, | Performed by: NURSE PRACTITIONER

## 2022-06-21 PROCEDURE — 87389 HIV-1 AG W/HIV-1&-2 AB AG IA: CPT | Performed by: NURSE PRACTITIONER

## 2022-06-21 PROCEDURE — 1160F RVW MEDS BY RX/DR IN RCRD: CPT | Mod: CPTII,S$GLB,, | Performed by: NURSE PRACTITIONER

## 2022-06-21 PROCEDURE — 87591 N.GONORRHOEAE DNA AMP PROB: CPT | Mod: 59 | Performed by: NURSE PRACTITIONER

## 2022-06-21 PROCEDURE — 3008F BODY MASS INDEX DOCD: CPT | Mod: CPTII,S$GLB,, | Performed by: NURSE PRACTITIONER

## 2022-06-21 PROCEDURE — 3078F DIAST BP <80 MM HG: CPT | Mod: CPTII,S$GLB,, | Performed by: NURSE PRACTITIONER

## 2022-06-21 PROCEDURE — 36415 COLL VENOUS BLD VENIPUNCTURE: CPT | Performed by: NURSE PRACTITIONER

## 2022-06-21 PROCEDURE — 80074 ACUTE HEPATITIS PANEL: CPT | Performed by: NURSE PRACTITIONER

## 2022-06-21 PROCEDURE — 1159F PR MEDICATION LIST DOCUMENTED IN MEDICAL RECORD: ICD-10-PCS | Mod: CPTII,S$GLB,, | Performed by: NURSE PRACTITIONER

## 2022-06-21 PROCEDURE — 87491 CHLMYD TRACH DNA AMP PROBE: CPT | Performed by: NURSE PRACTITIONER

## 2022-06-21 PROCEDURE — 87625 HPV TYPES 16 & 18 ONLY: CPT | Performed by: NURSE PRACTITIONER

## 2022-06-21 PROCEDURE — 87624 HPV HI-RISK TYP POOLED RSLT: CPT | Performed by: NURSE PRACTITIONER

## 2022-06-21 PROCEDURE — 99395 PR PREVENTIVE VISIT,EST,18-39: ICD-10-PCS | Mod: 25,S$GLB,, | Performed by: NURSE PRACTITIONER

## 2022-06-21 PROCEDURE — 99395 PREV VISIT EST AGE 18-39: CPT | Mod: 25,S$GLB,, | Performed by: NURSE PRACTITIONER

## 2022-06-21 PROCEDURE — 3078F PR MOST RECENT DIASTOLIC BLOOD PRESSURE < 80 MM HG: ICD-10-PCS | Mod: CPTII,S$GLB,, | Performed by: NURSE PRACTITIONER

## 2022-06-21 PROCEDURE — 3074F PR MOST RECENT SYSTOLIC BLOOD PRESSURE < 130 MM HG: ICD-10-PCS | Mod: CPTII,S$GLB,, | Performed by: NURSE PRACTITIONER

## 2022-06-21 NOTE — PROGRESS NOTES
Subjective:       Patient ID: Lillie Starr is a 21 y.o. female.    Chief Complaint:  Well Woman and Pelvic Pain    Patient's last menstrual period was 06/10/2022.  History of Present Illness  Annual Exam-Premenopausal  Patient presents for annual exam. The patient is sexually active. GYN screening history: no prior history of gyn screening tests. The patient wears seatbelts: yes. The patient participates in regular exercise: yes. Has the patient ever been transfused or tattooed?: yes. The patient reports that there is not domestic violence in her life.  Patient reports infidelity of partner and is requesting STD screening.  Patient also reports sore throat for the last 3 weeks and is concerned about STD exposure.  Requesting throat culture for gonorrhea and chlamydia.  Patient has complaints of mild pelvic pain.  Denies fever.  Denies vaginal discharge, odor, or itching.  Denies urinary symptoms.  Patient does struggle with constipation.  Last bowel movement yesterday.    OB History    Para Term  AB Living   1 0 0 0 0 1   SAB IAB Ectopic Multiple Live Births   0 0 0 0 0      # Outcome Date GA Lbr Mejia/2nd Weight Sex Delivery Anes PTL Lv   1                 Review of Systems  Review of Systems   Constitutional: Negative for appetite change, fatigue, fever and unexpected weight change.   Eyes: Negative for visual disturbance.   Cardiovascular: Negative for chest pain.   Gastrointestinal: Positive for constipation. Negative for abdominal pain, bloating, diarrhea, nausea and vomiting.   Genitourinary: Positive for pelvic pain (mild). Negative for bladder incontinence, dysmenorrhea, dyspareunia, dysuria, flank pain, frequency, genital sores, menorrhagia, menstrual problem, urgency, vaginal bleeding, vaginal discharge, vaginal pain, postcoital bleeding, vaginal dryness and vaginal odor.   Integumentary:  Negative for rash, acne, mole/lesion, breast mass, nipple discharge, breast skin changes  and breast tenderness.   Neurological: Negative for syncope and headaches.   Hematological: Negative for adenopathy. Does not bruise/bleed easily.   All other systems reviewed and are negative.  Breast: Positive for breast self exam.Negative for asymmetry, lump, mass, nipple discharge, skin changes and tenderness           Objective:      Physical Exam:   Constitutional: She is oriented to person, place, and time. She appears well-developed and well-nourished.    HENT:   Head: Normocephalic and atraumatic.    Eyes: Pupils are equal, round, and reactive to light. Conjunctivae and EOM are normal.     Cardiovascular: Normal rate and regular rhythm.     Pulmonary/Chest: Effort normal. Right breast exhibits no inverted nipple, no mass, no nipple discharge, no skin change, no tenderness, no bleeding and no swelling. Left breast exhibits no inverted nipple, no mass, no nipple discharge, no skin change, no tenderness, no bleeding and no swelling. Breasts are symmetrical.        Abdominal: Soft. Hernia confirmed negative in the right inguinal area and confirmed negative in the left inguinal area.     Genitourinary:    Inguinal canal, vagina, uterus, right adnexa, left adnexa and rectum normal.      Pelvic exam was performed with patient supine.   The external female genitalia was normal.   Genitalia hair distrobution normal .   Labial bartholins normal.There is no rash, tenderness, lesion or injury on the right labia. There is no rash, tenderness, lesion or injury on the left labia. Cervix is normal. No erythema,  no vaginal discharge, bleeding, rectocele, cystocele or unspecified prolapse of vaginal walls in the vagina. Cervix exhibits no motion tenderness and no friability.    pap smear completedUterus is not tender.           Musculoskeletal: Normal range of motion and moves all extremeties.      Lymphadenopathy: No inguinal adenopathy noted on the right or left side.    Neurological: She is alert and oriented to person,  place, and time.    Skin: Skin is warm and dry. No rash noted. No erythema.    Psychiatric: She has a normal mood and affect. Her behavior is normal. Judgment and thought content normal.        Urine dipstick shows negative for all components.  UPT negative.       Assessment:     1. Well woman exam with routine gynecological exam    2. Encounter for screening for cervical cancer    3. Screen for STD (sexually transmitted disease)    4. Pelvic pain              Plan:   Lillie was seen today for well woman and pelvic pain.    Diagnoses and all orders for this visit:    Well woman exam with routine gynecological exam  -     Liquid-Based Pap Smear, Screening    Encounter for screening for cervical cancer  -     Liquid-Based Pap Smear, Screening    Screen for STD (sexually transmitted disease)  -     HIV 1/2 Ag/Ab (4th Gen); Future  -     RPR; Future  -     Hepatitis Panel, Acute; Future  -     C. trachomatis/N. gonorrhoeae by AMP DNA  -     Vaginosis Screen by DNA Probe  -     C. trachomatis/N. gonorrhoeae by AMP DNA    Pelvic pain  -     C. trachomatis/N. gonorrhoeae by AMP DNA  -     Vaginosis Screen by DNA Probe  -     POCT urine pregnancy  -     POCT urine dipstick without microscope      Will follow vaginal cultures and treat as indicated.  Recommend no sexual activity until cultures have resulted.  Return to clinic if worsening of pelvic pain.    Follow up with me in 1 year for annual well woman exam.

## 2022-06-23 LAB
HAV IGM SERPL QL IA: NEGATIVE
HBV CORE IGM SERPL QL IA: NEGATIVE
HBV SURFACE AG SERPL QL IA: NEGATIVE
HCV AB SERPL QL IA: NEGATIVE
HIV 1+2 AB+HIV1 P24 AG SERPL QL IA: NEGATIVE
RPR SER QL: NORMAL

## 2022-06-24 LAB
C TRACH DNA SPEC QL NAA+PROBE: NOT DETECTED
C TRACH DNA SPEC QL NAA+PROBE: NOT DETECTED
N GONORRHOEA DNA SPEC QL NAA+PROBE: NOT DETECTED
N GONORRHOEA DNA SPEC QL NAA+PROBE: NOT DETECTED

## 2022-06-25 LAB
BACTERIAL VAGINOSIS DNA: NEGATIVE
CANDIDA GLABRATA DNA: NEGATIVE
CANDIDA KRUSEI DNA: NEGATIVE
CANDIDA RRNA VAG QL PROBE: NEGATIVE
T VAGINALIS RRNA GENITAL QL PROBE: NEGATIVE

## 2022-07-11 LAB
CLINICAL INFO: ABNORMAL
CYTO CVX: ABNORMAL
CYTOLOGIST CVX/VAG CYTO: ABNORMAL
CYTOLOGIST CVX/VAG CYTO: ABNORMAL
CYTOLOGY CMNT CVX/VAG CYTO-IMP: ABNORMAL
CYTOLOGY PAP THIN PREP EXPLANATION: ABNORMAL
DATE OF PREVIOUS PAP: ABNORMAL
DATE PREVIOUS BX: NO
GEN CATEG CVX/VAG CYTO-IMP: ABNORMAL
HPV I/H RISK 4 DNA CVX QL NAA+PROBE: DETECTED
HPV16 DNA CVX QL PROBE+SIG AMP: NOT DETECTED
HPV18 DNA CVX QL PROBE+SIG AMP: NOT DETECTED
LMP START DATE: ABNORMAL
MICROORGANISM CVX/VAG CYTO: ABNORMAL
PATHOLOGIST CVX/VAG CYTO: ABNORMAL
SERVICE CMNT-IMP: ABNORMAL
SPECIMEN SOURCE CVX/VAG CYTO: ABNORMAL
STAT OF ADQ CVX/VAG CYTO-IMP: ABNORMAL

## 2022-07-16 ENCOUNTER — PATIENT MESSAGE (OUTPATIENT)
Dept: OBSTETRICS AND GYNECOLOGY | Facility: CLINIC | Age: 22
End: 2022-07-16
Payer: COMMERCIAL

## 2023-03-20 ENCOUNTER — HOSPITAL ENCOUNTER (EMERGENCY)
Facility: HOSPITAL | Age: 23
Discharge: HOME | End: 2023-03-21
Attending: EMERGENCY MEDICINE
Payer: COMMERCIAL

## 2023-03-20 DIAGNOSIS — R51.9 ACUTE NONINTRACTABLE HEADACHE, UNSPECIFIED HEADACHE TYPE: ICD-10-CM

## 2023-03-20 DIAGNOSIS — G43.909 ACUTE MIGRAINE: Primary | ICD-10-CM

## 2023-03-20 LAB
ALBUMIN SERPL BCP-MCNC: 4.5 G/DL (ref 3.5–5.2)
ALP SERPL-CCNC: 61 U/L (ref 55–135)
ALT SERPL W/O P-5'-P-CCNC: 17 U/L (ref 10–44)
ANION GAP SERPL CALC-SCNC: 10 MMOL/L (ref 8–16)
AST SERPL-CCNC: 20 U/L (ref 10–40)
BASOPHILS # BLD AUTO: 0.03 K/UL (ref 0–0.2)
BASOPHILS NFR BLD: 0.4 % (ref 0–1.9)
BILIRUB SERPL-MCNC: 0.5 MG/DL (ref 0.1–1)
BUN SERPL-MCNC: 14 MG/DL (ref 6–20)
CALCIUM SERPL-MCNC: 9.3 MG/DL (ref 8.7–10.5)
CHLORIDE SERPL-SCNC: 104 MMOL/L (ref 95–110)
CO2 SERPL-SCNC: 25 MMOL/L (ref 23–29)
CREAT SERPL-MCNC: 1 MG/DL (ref 0.5–1.4)
DIFFERENTIAL METHOD: NORMAL
EOSINOPHIL # BLD AUTO: 0.1 K/UL (ref 0–0.5)
EOSINOPHIL NFR BLD: 1.6 % (ref 0–8)
ERYTHROCYTE [DISTWIDTH] IN BLOOD BY AUTOMATED COUNT: 13.9 % (ref 11.5–14.5)
EST. GFR  (NO RACE VARIABLE): >60 ML/MIN/1.73 M^2
GLUCOSE SERPL-MCNC: 85 MG/DL (ref 70–110)
HCT VFR BLD AUTO: 38.9 % (ref 37–48.5)
HCV AB SERPL QL IA: NEGATIVE
HEP C VIRUS HOLD SPECIMEN: NORMAL
HGB BLD-MCNC: 13 G/DL (ref 12–16)
HIV 1+2 AB+HIV1 P24 AG SERPL QL IA: NEGATIVE
IMM GRANULOCYTES # BLD AUTO: 0.01 K/UL (ref 0–0.04)
IMM GRANULOCYTES NFR BLD AUTO: 0.1 % (ref 0–0.5)
LYMPHOCYTES # BLD AUTO: 1.5 K/UL (ref 1–4.8)
LYMPHOCYTES NFR BLD: 20.3 % (ref 18–48)
MCH RBC QN AUTO: 28.9 PG (ref 27–31)
MCHC RBC AUTO-ENTMCNC: 33.4 G/DL (ref 32–36)
MCV RBC AUTO: 86 FL (ref 82–98)
MONOCYTES # BLD AUTO: 0.6 K/UL (ref 0.3–1)
MONOCYTES NFR BLD: 8.1 % (ref 4–15)
NEUTROPHILS # BLD AUTO: 5.2 K/UL (ref 1.8–7.7)
NEUTROPHILS NFR BLD: 69.5 % (ref 38–73)
NRBC BLD-RTO: 0 /100 WBC
PLATELET # BLD AUTO: 226 K/UL (ref 150–450)
PMV BLD AUTO: 9.5 FL (ref 9.2–12.9)
POTASSIUM SERPL-SCNC: 4.1 MMOL/L (ref 3.5–5.1)
PROT SERPL-MCNC: 8.3 G/DL (ref 6–8.4)
RBC # BLD AUTO: 4.5 M/UL (ref 4–5.4)
SODIUM SERPL-SCNC: 139 MMOL/L (ref 136–145)
WBC # BLD AUTO: 7.52 K/UL (ref 3.9–12.7)

## 2023-03-20 PROCEDURE — 86803 HEPATITIS C AB TEST: CPT | Performed by: EMERGENCY MEDICINE

## 2023-03-20 PROCEDURE — 96361 HYDRATE IV INFUSION ADD-ON: CPT

## 2023-03-20 PROCEDURE — 96374 THER/PROPH/DIAG INJ IV PUSH: CPT

## 2023-03-20 PROCEDURE — 87389 HIV-1 AG W/HIV-1&-2 AB AG IA: CPT | Performed by: EMERGENCY MEDICINE

## 2023-03-20 PROCEDURE — 25000003 PHARM REV CODE 250: Performed by: NURSE PRACTITIONER

## 2023-03-20 PROCEDURE — 84443 ASSAY THYROID STIM HORMONE: CPT | Performed by: EMERGENCY MEDICINE

## 2023-03-20 PROCEDURE — 63600175 PHARM REV CODE 636 W HCPCS: Performed by: NURSE PRACTITIONER

## 2023-03-20 PROCEDURE — 80053 COMPREHEN METABOLIC PANEL: CPT | Performed by: NURSE PRACTITIONER

## 2023-03-20 PROCEDURE — 99284 EMERGENCY DEPT VISIT MOD MDM: CPT | Mod: 25

## 2023-03-20 PROCEDURE — 96375 TX/PRO/DX INJ NEW DRUG ADDON: CPT

## 2023-03-20 PROCEDURE — 36415 COLL VENOUS BLD VENIPUNCTURE: CPT | Performed by: EMERGENCY MEDICINE

## 2023-03-20 PROCEDURE — 85025 COMPLETE CBC W/AUTO DIFF WBC: CPT | Performed by: NURSE PRACTITIONER

## 2023-03-20 RX ORDER — METOCLOPRAMIDE HYDROCHLORIDE 5 MG/ML
10 INJECTION INTRAMUSCULAR; INTRAVENOUS
Status: COMPLETED | OUTPATIENT
Start: 2023-03-20 | End: 2023-03-20

## 2023-03-20 RX ORDER — KETOROLAC TROMETHAMINE 30 MG/ML
30 INJECTION, SOLUTION INTRAMUSCULAR; INTRAVENOUS
Status: COMPLETED | OUTPATIENT
Start: 2023-03-20 | End: 2023-03-20

## 2023-03-20 RX ORDER — DIPHENHYDRAMINE HYDROCHLORIDE 50 MG/ML
25 INJECTION INTRAMUSCULAR; INTRAVENOUS
Status: COMPLETED | OUTPATIENT
Start: 2023-03-20 | End: 2023-03-20

## 2023-03-20 RX ADMIN — SODIUM CHLORIDE 1000 ML: 9 INJECTION, SOLUTION INTRAVENOUS at 11:03

## 2023-03-20 RX ADMIN — KETOROLAC TROMETHAMINE 30 MG: 30 INJECTION, SOLUTION INTRAMUSCULAR; INTRAVENOUS at 11:03

## 2023-03-20 RX ADMIN — DIPHENHYDRAMINE HYDROCHLORIDE 25 MG: 50 INJECTION, SOLUTION INTRAMUSCULAR; INTRAVENOUS at 11:03

## 2023-03-20 RX ADMIN — METOCLOPRAMIDE 10 MG: 5 INJECTION, SOLUTION INTRAMUSCULAR; INTRAVENOUS at 11:03

## 2023-03-21 VITALS
DIASTOLIC BLOOD PRESSURE: 70 MMHG | OXYGEN SATURATION: 100 % | BODY MASS INDEX: 22.83 KG/M2 | HEIGHT: 66 IN | TEMPERATURE: 99 F | RESPIRATION RATE: 16 BRPM | WEIGHT: 142.06 LBS | HEART RATE: 74 BPM | SYSTOLIC BLOOD PRESSURE: 128 MMHG

## 2023-03-21 LAB
B-HCG UR QL: NEGATIVE
BILIRUB UR QL STRIP: NEGATIVE
CLARITY UR: CLEAR
COLOR UR: YELLOW
GLUCOSE UR QL STRIP: NEGATIVE
HGB UR QL STRIP: NEGATIVE
KETONES UR QL STRIP: NEGATIVE
LEUKOCYTE ESTERASE UR QL STRIP: ABNORMAL
MICROSCOPIC COMMENT: NORMAL
NITRITE UR QL STRIP: NEGATIVE
PH UR STRIP: 7 [PH] (ref 5–8)
PROT UR QL STRIP: NEGATIVE
SP GR UR STRIP: 1.02 (ref 1–1.03)
TSH SERPL DL<=0.005 MIU/L-ACNC: 2.79 UIU/ML (ref 0.4–4)
URN SPEC COLLECT METH UR: ABNORMAL
UROBILINOGEN UR STRIP-ACNC: NEGATIVE EU/DL
WBC #/AREA URNS HPF: 0 /HPF (ref 0–5)

## 2023-03-21 PROCEDURE — 81000 URINALYSIS NONAUTO W/SCOPE: CPT | Performed by: NURSE PRACTITIONER

## 2023-03-21 PROCEDURE — 81025 URINE PREGNANCY TEST: CPT | Performed by: NURSE PRACTITIONER

## 2023-03-21 NOTE — ED PROVIDER NOTES
SCRIBE #1 NOTE: I, Kiko Gomes, am scribing for, and in the presence of, Farhan Lake MD. I have scribed the entire note.       History     Chief Complaint   Patient presents with    Headache     Pt reports hx of migraines. Pt states over the counter meds not working. Pt saw neurologist today and was prescribed new med however pt has not taken med yet     HPI  3/20/2023, 11:46 PM  History obtained from the mother and patient    HPI:  Lillie Starr is a 22 y.o. female with a PMHx of migraines and Grave's disease who presents to the [facility] emergency department for evaluation of migraine which onset earlier this afternoon PTA. Pt also had thyroid surgery and is currently on Levothyroxine. Pt describes pain as being worse on the left side of her head but has improved since earlier. Pt went to her neurologist and an MRI is scheduled in April.  Associated symptoms include heat sensation on one side of face. No exacerbating factors reported. Pt was given a 25 mg Diphenhydramine injection and a 30 mg Ketorolac injection yesterday.  No other complaints or concerns.     Arrival mode: Personal vehicle     An independent history was also taken from the patient's mom due to the patient's condition, who added: pt was experiencing numbness in the back of her neck and head when visiting the neurologist. Pt's mother also stated that she nurses her two year old child and will begin weaning him off soon.         PCP: Rafael Palmer MD    Review of patient's allergies indicates:   Allergen Reactions    Clindamycin Hives      Past Medical History:   Diagnosis Date    Abnormal Pap smear of cervix     Anemia     Graves disease     Graves disease     Scoliosis      Past Surgical History:   Procedure Laterality Date    DILATION AND CURETTAGE OF UTERUS  03/19/2018    iron infusion      SCOLIOSIS REPAIR  05/19/2017    THYROID SURGERY      TYMPANOSTOMY TUBE PLACEMENT         No family history on file.  Social  "History     Tobacco Use    Smoking status: Never    Smokeless tobacco: Never   Substance and Sexual Activity    Alcohol use: No    Drug use: No    Sexual activity: Yes     Partners: Male      Review of Systems     Review of Systems   Constitutional: Negative.    HENT: Negative.     Eyes: Negative.    Respiratory: Negative.     Cardiovascular: Negative.    Gastrointestinal: Negative.    Endocrine: Negative.    Genitourinary: Negative.    Musculoskeletal: Negative.    Skin: Negative.    Allergic/Immunologic: Negative.    Neurological:  Positive for headaches (migraine).   Hematological: Negative.    Psychiatric/Behavioral: Negative.     All other systems reviewed and are negative.       Physical Exam     Initial Vitals [03/20/23 2238]   BP Pulse Resp Temp SpO2   118/83 82 18 98.2 °F (36.8 °C) 100 %      MAP       --          Physical Exam  Nursing notes and vital signs reviewed.  Constitutional: Patient is in moderate to severe distress.   Head: Normocephalic. Atraumatic.   Eyes: Pt prefers to keep eyes closed s/t pain. Pt wearing sunglasses.  ENT: Mucous membranes moist.   Neck: Supple.   Cardiovascular: Regular rate. Regular rhythm.   Pulmonary: No respiratory distress.   Abdominal: Non-distended.  Musculoskeletal: Moves all extremities. No obvious deformities. No edema.   Skin: Warm and dry.   Neurological:  Somnolent but appropriate. No acute lateralizing neurologic deficits appreciated. Strength 5/5 in all four extremities.  CNII-XII intact. No dysmetria. Normal tone. No tremor. Follows commands.  Intact speech, intact comprehension.   Psychiatric: Normal affect.       ED Course   Procedures  Vitals:    03/20/23 2238 03/20/23 2314 03/21/23 0111   BP: 118/83 134/60 128/70   Pulse: 82 77 74   Resp: 18 16 16   Temp: 98.2 °F (36.8 °C)  98.9 °F (37.2 °C)   TempSrc: Oral  Oral   SpO2: 100% 100% 100%   Weight: 64.4 kg (142 lb 1.4 oz)     Height: 5' 6" (1.676 m)       Lab Results Interpreted as Abnormal:  Labs Reviewed "   URINALYSIS, REFLEX TO URINE CULTURE - Abnormal; Notable for the following components:       Result Value    Leukocytes, UA Trace (*)     All other components within normal limits    Narrative:     Specimen Source->Urine   CBC W/ AUTO DIFFERENTIAL    Narrative:     Release to patient->Immediate   COMPREHENSIVE METABOLIC PANEL    Narrative:     Release to patient->Immediate   PREGNANCY TEST, URINE RAPID    Narrative:     Specimen Source->Urine   HIV 1 / 2 ANTIBODY    Narrative:     Release to patient->Immediate   HEPATITIS C ANTIBODY    Narrative:     Release to patient->Immediate   HEP C VIRUS HOLD SPECIMEN    Narrative:     Release to patient->Immediate   TSH   TSH    Narrative:     Release to patient->Immediate   URINALYSIS MICROSCOPIC    Narrative:     Specimen Source->Urine      All Lab Results:  Results for orders placed or performed during the hospital encounter of 03/20/23   CBC auto differential   Result Value Ref Range    WBC 7.52 3.90 - 12.70 K/uL    RBC 4.50 4.00 - 5.40 M/uL    Hemoglobin 13.0 12.0 - 16.0 g/dL    Hematocrit 38.9 37.0 - 48.5 %    MCV 86 82 - 98 fL    MCH 28.9 27.0 - 31.0 pg    MCHC 33.4 32.0 - 36.0 g/dL    RDW 13.9 11.5 - 14.5 %    Platelets 226 150 - 450 K/uL    MPV 9.5 9.2 - 12.9 fL    Immature Granulocytes 0.1 0.0 - 0.5 %    Gran # (ANC) 5.2 1.8 - 7.7 K/uL    Immature Grans (Abs) 0.01 0.00 - 0.04 K/uL    Lymph # 1.5 1.0 - 4.8 K/uL    Mono # 0.6 0.3 - 1.0 K/uL    Eos # 0.1 0.0 - 0.5 K/uL    Baso # 0.03 0.00 - 0.20 K/uL    nRBC 0 0 /100 WBC    Gran % 69.5 38.0 - 73.0 %    Lymph % 20.3 18.0 - 48.0 %    Mono % 8.1 4.0 - 15.0 %    Eosinophil % 1.6 0.0 - 8.0 %    Basophil % 0.4 0.0 - 1.9 %    Differential Method Automated    Comprehensive metabolic panel   Result Value Ref Range    Sodium 139 136 - 145 mmol/L    Potassium 4.1 3.5 - 5.1 mmol/L    Chloride 104 95 - 110 mmol/L    CO2 25 23 - 29 mmol/L    Glucose 85 70 - 110 mg/dL    BUN 14 6 - 20 mg/dL    Creatinine 1.0 0.5 - 1.4 mg/dL     Calcium 9.3 8.7 - 10.5 mg/dL    Total Protein 8.3 6.0 - 8.4 g/dL    Albumin 4.5 3.5 - 5.2 g/dL    Total Bilirubin 0.5 0.1 - 1.0 mg/dL    Alkaline Phosphatase 61 55 - 135 U/L    AST 20 10 - 40 U/L    ALT 17 10 - 44 U/L    Anion Gap 10 8 - 16 mmol/L    eGFR >60 >60 mL/min/1.73 m^2   Urinalysis, Reflex to Urine Culture Urine, Clean Catch    Specimen: Urine   Result Value Ref Range    Specimen UA Urine, Clean Catch     Color, UA Yellow Yellow, Straw, Bella    Appearance, UA Clear Clear    pH, UA 7.0 5.0 - 8.0    Specific Gravity, UA 1.020 1.005 - 1.030    Protein, UA Negative Negative    Glucose, UA Negative Negative    Ketones, UA Negative Negative    Bilirubin (UA) Negative Negative    Occult Blood UA Negative Negative    Nitrite, UA Negative Negative    Urobilinogen, UA Negative <2.0 EU/dL    Leukocytes, UA Trace (A) Negative   Pregnancy, urine rapid   Result Value Ref Range    Preg Test, Ur Negative    HIV 1/2 Ag/Ab (4th Gen)   Result Value Ref Range    HIV 1/2 Ag/Ab Negative Negative   Hepatitis C Antibody   Result Value Ref Range    Hepatitis C Ab Negative Negative   HCV Virus Hold Specimen   Result Value Ref Range    HEP C Virus Hold Specimen Hold for HCV sendout    TSH   Result Value Ref Range    TSH 2.792 0.400 - 4.000 uIU/mL   Urinalysis Microscopic   Result Value Ref Range    WBC, UA 0 0 - 5 /hpf    Microscopic Comment SEE COMMENT      Imaging Results    None              The emergency physician reviewed the vital signs and test results, which are outlined above.     ED Discussion         Patient's evaluation in the ED does not suggest any emergent or life-threatening medical conditions requiring immediate intervention beyond what was provided in the ED, and I believe patient is safe for discharge.  Regardless, an unremarkable evaluation in the ED does not preclude the development or presence of a serious or life-threatening condition. As such, patient was given return instructions for any change or worsening  of symptoms             ED Medication(s):  Medications   sodium chloride 0.9% bolus 1,000 mL 1,000 mL (0 mLs Intravenous Stopped 3/21/23 0003)   ketorolac injection 30 mg (30 mg Intravenous Given 3/20/23 2307)   diphenhydrAMINE injection 25 mg (25 mg Intravenous Given 3/20/23 2308)   metoclopramide HCl injection 10 mg (10 mg Intravenous Given 3/20/23 2308)     Discharge Medication List as of 3/21/2023  1:00 AM        Prescription Management: I performed a review of the patient's current Rx medication list as input by nursing staff.     Follow-up Information       Tyron Worley MD. Schedule an appointment as soon as possible for a visit in 3 days.    Specialty: Neurology  Contact information:  [address] [facility]  [address]  [place] [address] [phone]               Schedule an appointment as soon as possible for a visit  with Rafael Palmer MD.    Specialty: Family Medicine  Contact information:  [address] [facility] [id number] [phone]               [facility] - Emergency Dept..    Specialty: Emergency Medicine  Why: As needed, If symptoms worsen  Contact information:  [address]  [facility] [address] [phone]                           Scribe Attestation:   Scribe #1: I performed the above scribed service and the documentation accurately describes the services I performed. I attest to the accuracy of the note.     Attending:   Physician Attestation Statement for Scribe #1: I, Farhan Lake MD, personally performed the services described in this documentation, as scribed by Kiko Gomes, in my presence, and it is both accurate and complete. As with other dictation methods such as dictation software, small errors or inconsistencies may be overlooked due to the goal of spending more face-to-face time with patients.      Clinical Impression       ICD-10-CM ICD-9-CM   1. Acute migraine  G43.909 346.90   2. Acute nonintractable headache,  unspecified headache type  R51.9 784.0      ED Disposition Condition    Discharge Stable               Farhan Lake MD  03/21/23 0234

## 2024-05-21 ENCOUNTER — HOSPITAL ENCOUNTER (EMERGENCY)
Facility: HOSPITAL | Age: 24
Discharge: HOME | End: 2024-05-21
Attending: EMERGENCY MEDICINE
Payer: COMMERCIAL

## 2024-05-21 VITALS
TEMPERATURE: 98 F | BODY MASS INDEX: 21.25 KG/M2 | WEIGHT: 132.25 LBS | RESPIRATION RATE: 16 BRPM | HEART RATE: 85 BPM | HEIGHT: 66 IN | SYSTOLIC BLOOD PRESSURE: 110 MMHG | OXYGEN SATURATION: 100 % | DIASTOLIC BLOOD PRESSURE: 66 MMHG

## 2024-05-21 DIAGNOSIS — L55.1 SUNBURN, BLISTERING: Primary | ICD-10-CM

## 2024-05-21 LAB
ALBUMIN SERPL BCP-MCNC: 4.3 G/DL (ref 3.5–5.2)
ALP SERPL-CCNC: 54 U/L (ref 55–135)
ALT SERPL W/O P-5'-P-CCNC: 17 U/L (ref 10–44)
ANION GAP SERPL CALC-SCNC: 10 MMOL/L (ref 8–16)
AST SERPL-CCNC: 23 U/L (ref 10–40)
BASOPHILS # BLD AUTO: 0.02 K/UL (ref 0–0.2)
BASOPHILS NFR BLD: 0.2 % (ref 0–1.9)
BILIRUB SERPL-MCNC: 0.5 MG/DL (ref 0.1–1)
BUN SERPL-MCNC: 12 MG/DL (ref 6–20)
CALCIUM SERPL-MCNC: 9.9 MG/DL (ref 8.7–10.5)
CHLORIDE SERPL-SCNC: 106 MMOL/L (ref 95–110)
CO2 SERPL-SCNC: 21 MMOL/L (ref 23–29)
CREAT SERPL-MCNC: 0.8 MG/DL (ref 0.5–1.4)
DIFFERENTIAL METHOD BLD: ABNORMAL
EOSINOPHIL # BLD AUTO: 0 K/UL (ref 0–0.5)
EOSINOPHIL NFR BLD: 0 % (ref 0–8)
ERYTHROCYTE [DISTWIDTH] IN BLOOD BY AUTOMATED COUNT: 13.2 % (ref 11.5–14.5)
EST. GFR  (NO RACE VARIABLE): >60 ML/MIN/1.73 M^2
GLUCOSE SERPL-MCNC: 120 MG/DL (ref 70–110)
HCT VFR BLD AUTO: 41.6 % (ref 37–48.5)
HGB BLD-MCNC: 13.6 G/DL (ref 12–16)
IMM GRANULOCYTES # BLD AUTO: 0.02 K/UL (ref 0–0.04)
IMM GRANULOCYTES NFR BLD AUTO: 0.2 % (ref 0–0.5)
LYMPHOCYTES # BLD AUTO: 0.6 K/UL (ref 1–4.8)
LYMPHOCYTES NFR BLD: 5.8 % (ref 18–48)
MCH RBC QN AUTO: 28.1 PG (ref 27–31)
MCHC RBC AUTO-ENTMCNC: 32.7 G/DL (ref 32–36)
MCV RBC AUTO: 86 FL (ref 82–98)
MONOCYTES # BLD AUTO: 0.3 K/UL (ref 0.3–1)
MONOCYTES NFR BLD: 2.9 % (ref 4–15)
NEUTROPHILS # BLD AUTO: 9 K/UL (ref 1.8–7.7)
NEUTROPHILS NFR BLD: 90.9 % (ref 38–73)
NRBC BLD-RTO: 0 /100 WBC
PLATELET # BLD AUTO: 285 K/UL (ref 150–450)
PMV BLD AUTO: 9.9 FL (ref 9.2–12.9)
POTASSIUM SERPL-SCNC: 4.7 MMOL/L (ref 3.5–5.1)
PROT SERPL-MCNC: 8.5 G/DL (ref 6–8.4)
RBC # BLD AUTO: 4.84 M/UL (ref 4–5.4)
SODIUM SERPL-SCNC: 137 MMOL/L (ref 136–145)
WBC # BLD AUTO: 9.95 K/UL (ref 3.9–12.7)

## 2024-05-21 PROCEDURE — 80053 COMPREHEN METABOLIC PANEL: CPT | Performed by: EMERGENCY MEDICINE

## 2024-05-21 PROCEDURE — 25000003 PHARM REV CODE 250: Performed by: EMERGENCY MEDICINE

## 2024-05-21 PROCEDURE — 85025 COMPLETE CBC W/AUTO DIFF WBC: CPT | Performed by: EMERGENCY MEDICINE

## 2024-05-21 PROCEDURE — 96375 TX/PRO/DX INJ NEW DRUG ADDON: CPT

## 2024-05-21 PROCEDURE — 16020 DRESS/DEBRID P-THICK BURN S: CPT

## 2024-05-21 PROCEDURE — 96374 THER/PROPH/DIAG INJ IV PUSH: CPT | Mod: 59

## 2024-05-21 PROCEDURE — 63600175 PHARM REV CODE 636 W HCPCS: Performed by: EMERGENCY MEDICINE

## 2024-05-21 PROCEDURE — 96361 HYDRATE IV INFUSION ADD-ON: CPT | Mod: 59

## 2024-05-21 PROCEDURE — 99284 EMERGENCY DEPT VISIT MOD MDM: CPT | Mod: 25

## 2024-05-21 RX ORDER — HYDROCODONE BITARTRATE AND ACETAMINOPHEN 5; 325 MG/1; MG/1
1 TABLET ORAL EVERY 6 HOURS PRN
Qty: 10 TABLET | Refills: 0 | Status: SHIPPED | OUTPATIENT
Start: 2024-05-21

## 2024-05-21 RX ORDER — BACITRACIN ZINC 500 UNIT/G
OINTMENT (GRAM) TOPICAL
Status: COMPLETED | OUTPATIENT
Start: 2024-05-21 | End: 2024-05-21

## 2024-05-21 RX ORDER — BACITRACIN ZINC 500 UNIT/G
OINTMENT (GRAM) TOPICAL 2 TIMES DAILY
Qty: 113 G | Refills: 0 | Status: SHIPPED | OUTPATIENT
Start: 2024-05-21 | End: 2024-05-28

## 2024-05-21 RX ORDER — HYDROCODONE BITARTRATE AND ACETAMINOPHEN 5; 325 MG/1; MG/1
1 TABLET ORAL
Status: DISCONTINUED | OUTPATIENT
Start: 2024-05-21 | End: 2024-05-21

## 2024-05-21 RX ORDER — ONDANSETRON 4 MG/1
4 TABLET, ORALLY DISINTEGRATING ORAL EVERY 8 HOURS PRN
Qty: 11 TABLET | Refills: 0 | Status: SHIPPED | OUTPATIENT
Start: 2024-05-21 | End: 2024-05-24

## 2024-05-21 RX ORDER — ACETAMINOPHEN 325 MG/1
650 TABLET ORAL
Status: COMPLETED | OUTPATIENT
Start: 2024-05-21 | End: 2024-05-21

## 2024-05-21 RX ORDER — ONDANSETRON HYDROCHLORIDE 2 MG/ML
4 INJECTION, SOLUTION INTRAVENOUS
Status: COMPLETED | OUTPATIENT
Start: 2024-05-21 | End: 2024-05-21

## 2024-05-21 RX ORDER — MORPHINE SULFATE 4 MG/ML
4 INJECTION, SOLUTION INTRAMUSCULAR; INTRAVENOUS
Status: COMPLETED | OUTPATIENT
Start: 2024-05-21 | End: 2024-05-21

## 2024-05-21 RX ADMIN — ONDANSETRON 4 MG: 2 INJECTION INTRAMUSCULAR; INTRAVENOUS at 07:05

## 2024-05-21 RX ADMIN — SODIUM CHLORIDE 1000 ML: 9 INJECTION, SOLUTION INTRAVENOUS at 08:05

## 2024-05-21 RX ADMIN — BACITRACIN ZINC: 500 OINTMENT TOPICAL at 08:05

## 2024-05-21 RX ADMIN — ACETAMINOPHEN 650 MG: 325 TABLET ORAL at 07:05

## 2024-05-21 RX ADMIN — MORPHINE SULFATE 4 MG: 4 INJECTION INTRAVENOUS at 07:05

## 2024-05-21 NOTE — ED PROVIDER NOTES
"SCRIBE #1 NOTE: I, Anshu Springer and Hilda Rogers, am scribing for, and in the presence of, Du Gutiérrez MD. I have scribed the entire note.       History     Chief Complaint   Patient presents with    Sunburn     Pt c/o generalized sunburn w/ blisters to LUE/BLE & swelling noted to face. Pt reports used [place] oil for her vitiligo without diluting first approx 3days ago when sunburn occurred. Patent airway noted. Denies difficulty breathing.     Review of patient's allergies indicates:   Allergen Reactions    Clindamycin Hives         History of Present Illness     HPI    5/21/2024, 6:54 AM  History obtained from the patient      History of Present Illness: Lillie Starr is a 23 y.o. female patient with a PMHx of vitiligo, anemia, graves disease, and scoliosis who presents to the Emergency Department for evaluation of a sunburn which onset 3 days ago. She has blistering to the back of  the BLE, face, and forehead. She says that her skin feels like it's burning, worse around the blisters. 3 days ago, pt used "[place] oil" off of [employer]. She did not dilute it and applied to her whole body. She noticed some burning after sitting in the sun for 30 minutes, but blisters started appearing last night, worse today. She has a hx of vitiligo and notes that the blisters mainly appear where she had vitiligo. Symptoms are constant and moderate in severity. No mitigating or exacerbating factors reported. Associated sxs include facial swelling and sleep disturbances d/t the pain. Patient denies any CP, SOB, N/V/D, trouble swallowing, oral/genital blistering, and all other sxs at this time. Prior Tx includes aloe vera, burn cream, water, milk, and 800 mg ibuprofen without relief. She saw the oil on Tik[employer]. No further complaints or concerns at this time.       Arrival mode: Personal vehicle      PCP: Rafael Palmer MD        Past Medical History:  Past Medical History:   Diagnosis Date    Abnormal Pap smear of " cervix     Anemia     Graves disease     Graves disease     Scoliosis        Past Surgical History:  Past Surgical History:   Procedure Laterality Date    DILATION AND CURETTAGE OF UTERUS  03/19/2018    iron infusion      SCOLIOSIS REPAIR  05/19/2017    THYROID SURGERY      TYMPANOSTOMY TUBE PLACEMENT           Family History:  No family history on file.    Social History:  Social History     Tobacco Use    Smoking status: Never    Smokeless tobacco: Never   Substance and Sexual Activity    Alcohol use: No    Drug use: No    Sexual activity: Yes     Partners: Male        Review of Systems     Review of Systems   Constitutional:  Negative for fever.   HENT:  Positive for facial swelling. Negative for sore throat.    Respiratory:  Negative for shortness of breath.    Cardiovascular:  Negative for chest pain.   Gastrointestinal:  Negative for diarrhea, nausea and vomiting.   Genitourinary:  Negative for dysuria.   Musculoskeletal:  Negative for back pain.   Skin:  Positive for rash (generalized) and wound (blistering to L arm, back of bilateral knees, and face).        (-) Oral/Genital blisters   Neurological:  Negative for weakness.   Hematological:  Does not bruise/bleed easily.   Psychiatric/Behavioral:  Positive for sleep disturbance.    All other systems reviewed and are negative.     Physical Exam     Initial Vitals [05/21/24 0628]   BP Pulse Resp Temp SpO2   108/65 82 17 98.6 °F (37 °C) 100 %      MAP       --          Physical Exam  Nursing Notes and Vital Signs Reviewed.  Constitutional: Patient is in no acute distress. Well-developed and well-nourished.  Head: Atraumatic. Normocephalic.  Eyes: PERRL. EOM intact. Conjunctivae are not pale. No scleral icterus.  ENT: Mucous membranes are moist. Oropharynx is clear and symmetric.    Neck: Supple. Full ROM. No lymphadenopathy.  Cardiovascular: Regular rate. Regular rhythm. No murmurs, rubs, or gallops. Distal pulses are 2+ and symmetric.  Pulmonary/Chest: No  "respiratory distress. Clear to auscultation bilaterally. No wheezing or rales.  Abdominal: Soft and non-distended.  There is no tenderness.  No rebound, guarding, or rigidity. Good bowel sounds.  Genitourinary: No CVA tenderness.  Musculoskeletal: Moves all extremities. No obvious deformities. No edema. No calf tenderness.  Skin: 6% body surface area with bulla. Tense bulla on the front of the back, back of bilateral knees, L arm (antecubital fossa), L proximal anterior thigh. Minimal bullas to L anterior thigh. Negative Nikolsky's sign. Mild edema to the entirety of the face, moderate edema to the L periorbital region.   Neurological:  Alert, awake, and appropriate.  Normal speech.  No acute focal neurological deficits are appreciated.  Psychiatric: Normal affect. Good eye contact. Appropriate in content.                   ED Course   Burn    Date/Time: 2024 8:44 AM  Location procedure was performed: [facility] EMERGENCY DEPARTMENT    Performed by: Du Gutiérrez MD  Authorized by: Du Gutiérrez MD  Consent Done: Yes  Consent: Verbal consent obtained.  Risks and benefits: risks, benefits and alternatives were discussed  Consent given by: patient  Patient understanding: patient states understanding of the procedure being performed  Patient consent: the patient's understanding of the procedure matches consent given  Procedure consent: procedure consent matches procedure scheduled  Relevant documents: relevant documents present and verified  Test results: test results available and properly labeled  Site marked: the operative site was marked  Required items: required blood products, implants, devices, and special equipment available  Patient identity confirmed: MRN, provided demographic data, name,  and verbally with patient  Time out: Immediately prior to procedure a "time out" was called to verify the correct patient, procedure, equipment, support staff and site/side marked as required.  Preparation: Patient " "was prepped and draped in the usual sterile fashion.  Local anesthesia used: no    Anesthesia:  Local anesthesia used: no    Patient sedated: no  Burn Area 1 Details  Affected area: left arm (L antecubital fossa)  Debridement performed: yes  Wound care: bacitracin  Dressing: non-stick sterile dressing  Burn Area 2 Details  Affected Area: left knee (posterior L knee)  Debridement performed: yes  Wound care: bacitracin  Dressing: non-stick sterile dressing  Burn Area 3 Details  Affected area: right knee (posterior R knee)  Debridement performed: yes  Wound care: bacitracin  Dressing: non-stick sterile dressing  Patient tolerance: Patient tolerated the procedure well with no immediate complications        ED Vital Signs:  Vitals:    05/21/24 0628 05/21/24 0752 05/21/24 0820 05/21/24 0925   BP: 108/65  112/68 110/66   Pulse: 82  85 85   Resp: 17 16 18 16   Temp: 98.6 °F (37 °C)  98.6 °F (37 °C) 98.4 °F (36.9 °C)   TempSrc: Oral  Oral Oral   SpO2: 100%  100% 100%   Weight: 60 kg (132 lb 4.4 oz)      Height: 5' 6" (1.676 m)          Abnormal Lab Results:  Labs Reviewed   COMPREHENSIVE METABOLIC PANEL - Abnormal; Notable for the following components:       Result Value    CO2 21 (*)     Glucose 120 (*)     Total Protein 8.5 (*)     Alkaline Phosphatase 54 (*)     All other components within normal limits   CBC W/ AUTO DIFFERENTIAL - Abnormal; Notable for the following components:    Gran # (ANC) 9.0 (*)     Lymph # 0.6 (*)     Gran % 90.9 (*)     Lymph % 5.8 (*)     Mono % 2.9 (*)     All other components within normal limits        All Lab Results:  Results for orders placed or performed during the hospital encounter of 05/21/24   Comprehensive metabolic panel   Result Value Ref Range    Sodium 137 136 - 145 mmol/L    Potassium 4.7 3.5 - 5.1 mmol/L    Chloride 106 95 - 110 mmol/L    CO2 21 (L) 23 - 29 mmol/L    Glucose 120 (H) 70 - 110 mg/dL    BUN 12 6 - 20 mg/dL    Creatinine 0.8 0.5 - 1.4 mg/dL    Calcium 9.9 8.7 - " 10.5 mg/dL    Total Protein 8.5 (H) 6.0 - 8.4 g/dL    Albumin 4.3 3.5 - 5.2 g/dL    Total Bilirubin 0.5 0.1 - 1.0 mg/dL    Alkaline Phosphatase 54 (L) 55 - 135 U/L    AST 23 10 - 40 U/L    ALT 17 10 - 44 U/L    eGFR >60 >60 mL/min/1.73 m^2    Anion Gap 10 8 - 16 mmol/L   CBC auto differential   Result Value Ref Range    WBC 9.95 3.90 - 12.70 K/uL    RBC 4.84 4.00 - 5.40 M/uL    Hemoglobin 13.6 12.0 - 16.0 g/dL    Hematocrit 41.6 37.0 - 48.5 %    MCV 86 82 - 98 fL    MCH 28.1 27.0 - 31.0 pg    MCHC 32.7 32.0 - 36.0 g/dL    RDW 13.2 11.5 - 14.5 %    Platelets 285 150 - 450 K/uL    MPV 9.9 9.2 - 12.9 fL    Immature Granulocytes 0.2 0.0 - 0.5 %    Gran # (ANC) 9.0 (H) 1.8 - 7.7 K/uL    Immature Grans (Abs) 0.02 0.00 - 0.04 K/uL    Lymph # 0.6 (L) 1.0 - 4.8 K/uL    Mono # 0.3 0.3 - 1.0 K/uL    Eos # 0.0 0.0 - 0.5 K/uL    Baso # 0.02 0.00 - 0.20 K/uL    nRBC 0 0 /100 WBC    Gran % 90.9 (H) 38.0 - 73.0 %    Lymph % 5.8 (L) 18.0 - 48.0 %    Mono % 2.9 (L) 4.0 - 15.0 %    Eosinophil % 0.0 0.0 - 8.0 %    Basophil % 0.2 0.0 - 1.9 %    Differential Method Automated         Imaging Results:  Imaging Results    None                 The Emergency Provider reviewed the vital signs and test results, which are outlined above.     ED Discussion     8:47 AM: Reassessed pt at this time. Informed pt of all f/u information with the burn clinic and her PCP. Discussed with pt all pertinent ED information and results. Discussed pt dx and plan of tx. Gave pt all f/u and return to the ED instructions. All questions and concerns were addressed at this time. Pt expresses understanding of information and instructions, and is comfortable with plan to discharge. Pt is stable for discharge.    I discussed with patient and/or family/caretaker that evaluation in the ED does not suggest any emergent or life threatening medical conditions requiring immediate intervention beyond what was provided in the ED, and I believe patient is safe for discharge.   Regardless, an unremarkable evaluation in the ED does not preclude the development or presence of a serious of life threatening condition. As such, patient was instructed to return immediately for any worsening or change in current symptoms.      ED Course as of 05/21/24 1859   Tue May 21, 2024   0653 Differential diagnosis includes 1st and second-degree burns, allergic dermatitis, impetigo, cellulitis, chemical burn. [BA]   0706 Contacting burn center to get reccomendations [BA]   0720 We discussed the case with poison control, they recommended symptomatic support.  [BA]   0738 Pictures shared with burn center. They recommended debriding blisters, covering with bacitracin ointment, and providing patient with analgesia to take prior to her burn center appointment.  [BA]      ED Course User Index  [BA] Du Gutiérrez MD     Medical Decision Making  Amount and/or Complexity of Data Reviewed  Labs: ordered. Decision-making details documented in ED Course.    Risk  OTC drugs.  Prescription drug management.                ED Medication(s):  Medications   acetaminophen tablet 650 mg (650 mg Oral Given 5/21/24 0717)   sodium chloride 0.9% bolus 1,000 mL 1,000 mL (0 mLs Intravenous Stopped 5/21/24 0855)   bacitracin ointment ( Topical (Top) Given 5/21/24 0850)   morphine injection 4 mg (4 mg Intravenous Given 5/21/24 0752)   ondansetron injection 4 mg (4 mg Intravenous Given 5/21/24 0752)       Discharge Medication List as of 5/21/2024  8:51 AM        START taking these medications    Details   bacitracin 500 unit/gram Oint Apply topically 2 (two) times daily. for 7 days, Starting Tue 5/21/2024, Until Tue 5/28/2024, Normal      HYDROcodone-acetaminophen (NORCO) 5-325 mg per tablet Take 1 tablet by mouth every 6 (six) hours as needed for Pain., Starting Tue 5/21/2024, Print              Follow-up Information       [facility]. Schedule an appointment as soon as possible for a visit in 2 days.    Why: For wound  re-check  Contact information:  See Handout             [facility] - Emergency Dept.. Go today.    Specialty: Emergency Medicine  Why: If symptoms worsen, For re-evaluation and further treatment, As needed  Contact information:  [address] [address] [facility] [address] [phone]             Spencer, Rafael Ortiz MD. Schedule an appointment as soon as possible for a visit in 1 week.    Specialty: Family Medicine  Contact information:  [address] [facility] [address] [phone]                                 Scribe Attestation:   Scribe #1: I performed the above scribed service and the documentation accurately describes the services I performed. I attest to the accuracy of the note.     Attending:   Physician Attestation Statement for Scribe #1: I, Du Gutiérrez MD, personally performed the services described in this documentation, as scribed by Anshu Springer and Hilda Rogers, in my presence, and it is both accurate and complete.           Clinical Impression       ICD-10-CM ICD-9-CM   1. Sunburn, blistering  L55.1 692.76       Disposition:   Disposition: Discharged  Condition: Stable         Du Gutiérrez MD  05/21/24 1900

## 2025-03-02 ENCOUNTER — HOSPITAL ENCOUNTER (EMERGENCY)
Facility: HOSPITAL | Age: 25
Discharge: HOME | End: 2025-03-02
Attending: EMERGENCY MEDICINE
Payer: COMMERCIAL

## 2025-03-02 VITALS
TEMPERATURE: 99 F | HEART RATE: 95 BPM | OXYGEN SATURATION: 99 % | HEIGHT: 66 IN | RESPIRATION RATE: 16 BRPM | WEIGHT: 123 LBS | SYSTOLIC BLOOD PRESSURE: 109 MMHG | DIASTOLIC BLOOD PRESSURE: 72 MMHG | BODY MASS INDEX: 19.77 KG/M2

## 2025-03-02 DIAGNOSIS — B00.1 FEVER BLISTER: ICD-10-CM

## 2025-03-02 DIAGNOSIS — K12.0 APHTHOUS ULCER OF MOUTH: ICD-10-CM

## 2025-03-02 DIAGNOSIS — B00.2 HERPETIC GINGIVOSTOMATITIS: Primary | ICD-10-CM

## 2025-03-02 DIAGNOSIS — F41.9 ANXIETY: ICD-10-CM

## 2025-03-02 PROCEDURE — 99284 EMERGENCY DEPT VISIT MOD MDM: CPT

## 2025-03-02 PROCEDURE — 87529 HSV DNA AMP PROBE: CPT | Performed by: EMERGENCY MEDICINE

## 2025-03-02 PROCEDURE — 25000003 PHARM REV CODE 250: Performed by: EMERGENCY MEDICINE

## 2025-03-02 RX ORDER — ACYCLOVIR 400 MG/1
400 TABLET ORAL 3 TIMES DAILY
Qty: 30 TABLET | Refills: 0 | Status: SHIPPED | OUTPATIENT
Start: 2025-03-02 | End: 2025-03-12

## 2025-03-02 RX ORDER — ACETAMINOPHEN AND CODEINE PHOSPHATE 120; 12 MG/5ML; MG/5ML
10 SOLUTION ORAL EVERY 6 HOURS PRN
Qty: 200 ML | Refills: 0 | Status: SHIPPED | OUTPATIENT
Start: 2025-03-02 | End: 2025-03-07

## 2025-03-02 RX ORDER — TRIPROLIDINE/PSEUDOEPHEDRINE 2.5MG-60MG
600 TABLET ORAL EVERY 8 HOURS PRN
Qty: 450 ML | Refills: 0 | Status: SHIPPED | OUTPATIENT
Start: 2025-03-02 | End: 2025-03-07

## 2025-03-02 RX ORDER — TRIPROLIDINE/PSEUDOEPHEDRINE 2.5MG-60MG
600 TABLET ORAL ONCE
Status: COMPLETED | OUTPATIENT
Start: 2025-03-02 | End: 2025-03-02

## 2025-03-02 RX ORDER — NYSTATIN 100000 [USP'U]/ML
500000 SUSPENSION ORAL 4 TIMES DAILY
Qty: 200 ML | Refills: 0 | Status: SHIPPED | OUTPATIENT
Start: 2025-03-02 | End: 2025-03-12

## 2025-03-02 RX ADMIN — IBUPROFEN 600 MG: 100 SUSPENSION ORAL at 12:03

## 2025-03-02 NOTE — ED PROVIDER NOTES
SCRIBE #1 NOTE: I, Timothy Deleon, am scribing for, and in the presence of, Sandra Do MD. I have scribed the entire note.       History     Chief Complaint   Patient presents with    Mouth Lesions     Oral ulcerations x 1 week with associated fever     Review of patient's allergies indicates:   Allergen Reactions    Clindamycin Hives         History of Present Illness     HPI    3/2/2025, 12:31 PM  History obtained from the patient, medical records, and mother      History of Present Illness: Lillie Starr is a 24 y.o. female patient with a PMHx of anemia, scoliosis, graves disease. And abnormal pap smear of cervix who presents to the Emergency Department for evaluation of mouth lesions which onset Saturday. Pt reports on Sunday PM she developed a high fever which has persisted until now. The next day, pt reports mouth and lip sores suddenly appeared. Symptoms are constant and moderate in severity. Pt reports sores are worse with certain foods. Associated sxs include difficulty eating secondary to mouth pain, nausea, and spitting yellow blood/sputum. Patient denies any sores to other parts of the body and all other sxs at this time. Patient was prescribed valtrex but only taking twice daily. Pt has also been putting Vaseline on her lips. Pt reports her son recently had similar sxs. Pt has a cat but reports the cat has not scratched her recently. Pt also denies any known hx of fever blisters. No further complaints or concerns at this time.       Arrival mode: Personal Transportation    PCP: Rafael Palmer MD        Past Medical History:  Past Medical History:   Diagnosis Date    Abnormal Pap smear of cervix     Anemia     Graves disease     Graves disease     Scoliosis        Past Surgical History:  Past Surgical History:   Procedure Laterality Date    DILATION AND CURETTAGE OF UTERUS  03/19/2018    iron infusion      SCOLIOSIS REPAIR  05/19/2017    THYROID SURGERY      TYMPANOSTOMY TUBE  PLACEMENT           Family History:  No family history on file.    Social History:  Social History     Tobacco Use    Smoking status: Never    Smokeless tobacco: Never   Substance and Sexual Activity    Alcohol use: No    Drug use: No    Sexual activity: Yes     Partners: Male        Review of Systems     Review of Systems   Constitutional:  Positive for appetite change (difficulty eating secondary to pain) and fever. Negative for chills and diaphoresis.   HENT:  Positive for mouth sores. Negative for congestion.         (+) yellow and blood sputum   Respiratory:  Negative for cough and shortness of breath.    Cardiovascular:  Negative for chest pain.   Gastrointestinal:  Positive for nausea. Negative for abdominal pain, diarrhea and vomiting.   Genitourinary:  Negative for dysuria.   Musculoskeletal:  Negative for back pain.   Skin:  Negative for rash.   Neurological:  Negative for dizziness, weakness and headaches.   All other systems reviewed and are negative.       Physical Exam     Initial Vitals [03/02/25 1130]   BP Pulse Resp Temp SpO2   109/72 95 16 99.1 °F (37.3 °C) 99 %      MAP       --          Physical Exam  Nursing Notes and Vital Signs Reviewed.  Constitutional: Patient appears ill, not toxic, anxious. Well-developed and well-nourished.  Head: Atraumatic. Normocephalic.  Eyes: EOM intact. Conjunctivae are not pale. No scleral icterus.  ENT: Mucous membranes are moist. There are small ulcerative lesions to her lips, dry cracking, no drainage or evidence of secondary infection. Airway patent. No drooling. Bilateral submandibular lymphadenopathy noted. Several small intraoral ulcers consistent with Aphthous stomatitis. No tongue swelling. No lesions to back of throat.   Neck: Supple. Full ROM.  Cardiovascular: Regular rate. Regular rhythm. No murmurs, rubs, or gallops.  Pulmonary/Chest: No respiratory distress. Clear to auscultation bilaterally. No wheezing or rales. No stridor.  Abdominal: Soft and  "non-distended.  There is no tenderness.  No rebound, guarding, or rigidity.  Musculoskeletal: Moves all extremities. No obvious deformities. No edema.  Skin: Warm and dry.  Neurological:  Alert, awake, and appropriate.  Normal speech.  No acute focal neurological deficits are appreciated.  Psychiatric: Anxious affect. Good eye contact. Appropriate in content.     ED Course   Procedures  ED Vital Signs:  Vitals:    03/02/25 1130   BP: 109/72   Pulse: 95   Resp: 16   Temp: 99.1 °F (37.3 °C)   TempSrc: Oral   SpO2: 99%   Weight: 55.8 kg (123 lb)   Height: 5' 6" (1.676 m)       Abnormal Lab Results:  Labs Reviewed   HERPES SIMPLEX (HSV) BY RAPID PCR, NON-BLOOD       Result Value    HSV PCR Source swab      Narrative:     Sources by Resulting Lab:->[facility]  Specimen Source:->Mouth  Release to patient->Immediate   CMV DNA, QUANTITATIVE, PCR    Cytomegalovirus PCR, Quant Not Detected      Cytomegalovirus DNA CMV DNA not detected          All Lab Results:  None.    Imaging Results:  Imaging Results    None                     The Emergency Provider reviewed the vital signs and test results, which are outlined above.     ED Discussion     1:06 PM: Reassessed pt at this time. Discussed with patient and/or family/caretaker all pertinent ED information and results. Discussed pt dx and plan of tx. Gave the patient and family all f/u and return to the ED instructions. All questions and concerns were addressed at this time. Patient and/or family/caretaker expresses understanding of information and instructions, and is comfortable with plan to discharge. Pt is stable for discharge.     I discussed with patient and/or family/caretaker that evaluation in the ED does not suggest any emergent or life threatening medical conditions requiring immediate intervention beyond what was provided in the ED, and I believe patient is safe for discharge.  Regardless, an unremarkable evaluation in the ED does not preclude the development or " presence of a serious of life threatening condition. As such, I instructed that the patient is to return immediately for any worsening or change in current symptoms.       Medical Decision Making  DDX: 1. HSV 2. Aphthaous ulcers 3. Hand Foot Mouth    Clinically appears to have herpetic lesions along her lips with aphthous mouth ulcers no concerns for secondary bacterial infection.  Patient has not been taking acyclovir correctly as she was given the wrong dosage.  She is tolerating po, her mother is with her, also possibly could have hand, foot and mouth but has no other lesions besides orally, there is no indication she needs admission, swabs obtained but will take several days to return.  Will tx for HSV, increase acyclovir to correct dose, dicussed OTC Glyoxide antiseptic mouth rinse, along with liquid pain medication, proper hydration discussed along with outpatient follow up and reasons to return given.     Amount and/or Complexity of Data Reviewed  Independent Historian: parent     Details: Additional hx per mother  External Data Reviewed: notes.     Details: Reviewed [facility] visits. Pt prescribed acyclovir 2 days ago, but she was prescribed to take it 2 times a day when the correct dosage is 3 times a day for 7-10 days.    Risk  Prescription drug management.  Diagnosis or treatment significantly limited by social determinants of health.                ED Medication(s):  Medications   ibuprofen 20 mg/mL oral liquid 600 mg (600 mg Oral Given 3/2/25 1240)       Discharge Medication List as of 3/2/2025  1:06 PM        START taking these medications    Details   acetaminophen with codeine (ACETAMINOPHEN-CODEINE) 120mg 12mg 5mL Soln solution Take 10 mLs by mouth every 6 (six) hours as needed (pain)., Starting Sun 3/2/2025, Until Fri 3/7/2025 at 2359, Normal      acyclovir (ZOVIRAX) 400 MG tablet Take 1 tablet (400 mg total) by mouth 3 (three) times daily. for 10 days, Starting Sun 3/2/2025, Until Wed 3/12/2025,  Normal      ibuprofen 20 mg/mL oral liquid Take 30 mLs (600 mg total) by mouth every 8 (eight) hours as needed for Pain., Starting Sun 3/2/2025, Until Fri 3/7/2025 at 2359, Normal      nystatin (MYCOSTATIN) 100,000 unit/mL suspension Take 5 mLs (500,000 Units total) by mouth 4 (four) times daily. for 10 days, Starting Sun 3/2/2025, Until Wed 3/12/2025, Normal              Follow-up Information       Spencer, Rafael Ortiz MD. Schedule an appointment as soon as possible for a visit in 2 days.    Specialty: Family Medicine  Why: Return to the Emergency Room, If symptoms worsen  Contact information:  [address] [place] [address]  [phone]                                 Scribe Attestation:   Scribe #1: I performed the above scribed service and the documentation accurately describes the services I performed. I attest to the accuracy of the note.     Attending:   Physician Attestation Statement for Scribe #1: I, Sandra Do MD, personally performed the services described in this documentation, as scribed by Timothy Deleon, in my presence, and it is both accurate and complete.           Clinical Impression       ICD-10-CM ICD-9-CM   1. Herpetic gingivostomatitis  B00.2 054.2   2. Aphthous ulcer of mouth  K12.0 528.2   3. Fever blister  B00.1 054.9   4. Anxiety  F41.9 300.00       Disposition:   Disposition: Discharged  Condition: Stable       Sandra Do MD  03/04/25 0403

## 2025-03-02 NOTE — DISCHARGE INSTRUCTIONS
Glyoxide oral mouth solution four times daily.  Acyclovir is going to be 1 pill by mouth three times daily for 10 days.

## 2025-03-03 LAB
CMV DNA SPEC QL NAA+PROBE: NORMAL
CYTOMEGALOVIRUS PCR, QUANT: NOT DETECTED IU/ML

## 2025-03-04 ENCOUNTER — TELEPHONE (OUTPATIENT)
Dept: INFECTIOUS DISEASES | Facility: CLINIC | Age: 25
End: 2025-03-04
Payer: COMMERCIAL

## 2025-03-04 NOTE — TELEPHONE ENCOUNTER
Pt scheduled for f/u with Dr. López. Pt verbalized understanding and agreed to appt date, time, and location.

## 2025-03-05 ENCOUNTER — OFFICE VISIT (OUTPATIENT)
Dept: INFECTIOUS DISEASES | Facility: CLINIC | Age: 25
End: 2025-03-05
Payer: COMMERCIAL

## 2025-03-05 ENCOUNTER — TELEPHONE (OUTPATIENT)
Dept: INFECTIOUS DISEASES | Facility: CLINIC | Age: 25
End: 2025-03-05

## 2025-03-05 VITALS
OXYGEN SATURATION: 100 % | SYSTOLIC BLOOD PRESSURE: 96 MMHG | HEART RATE: 98 BPM | WEIGHT: 124.56 LBS | HEIGHT: 66 IN | DIASTOLIC BLOOD PRESSURE: 80 MMHG | BODY MASS INDEX: 20.02 KG/M2

## 2025-03-05 DIAGNOSIS — E05.00 GRAVES' DISEASE: ICD-10-CM

## 2025-03-05 DIAGNOSIS — K13.79 OTHER LESIONS OF ORAL MUCOSA: ICD-10-CM

## 2025-03-05 DIAGNOSIS — G04.90 ENCEPHALITIS: ICD-10-CM

## 2025-03-05 DIAGNOSIS — G04.90 ENCEPHALITIS: Primary | ICD-10-CM

## 2025-03-05 DIAGNOSIS — B00.2 ORAL HERPES SIMPLEX INFECTION: Primary | ICD-10-CM

## 2025-03-05 LAB
HSV1 DNA SPEC QL NAA+PROBE: POSITIVE
HSV2 DNA SPEC QL NAA+PROBE: NEGATIVE
SPECIMEN SOURCE: ABNORMAL

## 2025-03-05 PROCEDURE — 3008F BODY MASS INDEX DOCD: CPT | Mod: CPTII,S$GLB,, | Performed by: STUDENT IN AN ORGANIZED HEALTH CARE EDUCATION/TRAINING PROGRAM

## 2025-03-05 PROCEDURE — 3074F SYST BP LT 130 MM HG: CPT | Mod: CPTII,S$GLB,, | Performed by: STUDENT IN AN ORGANIZED HEALTH CARE EDUCATION/TRAINING PROGRAM

## 2025-03-05 PROCEDURE — 1159F MED LIST DOCD IN RCRD: CPT | Mod: CPTII,S$GLB,, | Performed by: STUDENT IN AN ORGANIZED HEALTH CARE EDUCATION/TRAINING PROGRAM

## 2025-03-05 PROCEDURE — 99204 OFFICE O/P NEW MOD 45 MIN: CPT | Mod: S$GLB,,, | Performed by: STUDENT IN AN ORGANIZED HEALTH CARE EDUCATION/TRAINING PROGRAM

## 2025-03-05 PROCEDURE — 3079F DIAST BP 80-89 MM HG: CPT | Mod: CPTII,S$GLB,, | Performed by: STUDENT IN AN ORGANIZED HEALTH CARE EDUCATION/TRAINING PROGRAM

## 2025-03-05 PROCEDURE — 99999 PR PBB SHADOW E&M-EST. PATIENT-LVL IV: CPT | Mod: PBBFAC,,, | Performed by: STUDENT IN AN ORGANIZED HEALTH CARE EDUCATION/TRAINING PROGRAM

## 2025-03-05 RX ORDER — LINEZOLID 600 MG/1
600 TABLET, FILM COATED ORAL EVERY 12 HOURS
Qty: 20 TABLET | Refills: 0 | Status: SHIPPED | OUTPATIENT
Start: 2025-03-05 | End: 2025-03-15

## 2025-03-05 RX ORDER — METRONIDAZOLE 500 MG/1
500 TABLET ORAL EVERY 12 HOURS
Qty: 20 TABLET | Refills: 0 | Status: SHIPPED | OUTPATIENT
Start: 2025-03-05 | End: 2025-03-15

## 2025-03-05 NOTE — TELEPHONE ENCOUNTER
Notified the pt of upcomin very important test. She repeated back and voiced understand (CT tomorrow 0830a and LP Friday at 11a.).

## 2025-03-05 NOTE — LETTER
March 5, 2025      [facility]  [address]  [place] [address]  Phone: [phone]       Patient: Lillie Starr   YOB: 2000  Date of Visit: 03/05/2025    To Whom It May Concern:    Amrik Starr  was at [facility] on 03/05/2025. The patient may return to work Monday 03- with no restrictions. If you have any questions or concerns, or if I can be of further assistance, please do not hesitate to contact me.    Sincerely,      Dr. Kwabena López, DO

## 2025-03-05 NOTE — PROGRESS NOTES
"Infectious Disease Clinic Note    Patient Name: Lillie Starr  YOB: 2000    PRESENTING HISTORY       History of Present Illness:  Ms. Lillie Starr is a 24 y.o. female w/ significant PMHx of anemia and Grave's disease who presents for evaluation of mouth lesions. Was seen in ER on March 2nd. Described lesions as herpetic with aphthous mouth ulcers. Was prescribed acyclovir 400 mg TID. Patient says for a week could not eat or drink. Lymph nodes swollen. Since then, patient reports continued sputum production. Fevers have been low grade and at night. Highest was 104 F. Has felt more "spacy." Discussed results of molecular report collected 2/28 from throat/oropharynx. Explained that Strep/Staph and Fusobacterium part of normal cammy in all mouths. Fusobacterium can become pathogenic and cause inflammation of veins. HSV 1, 2 the cause of ulcers. Due to febrile illness will treat all reported pathogens. Plan for 10 day course of linezolid and Flagyl. Complete original acyclovir course. Patient also with bad headaches at beginning. This plus fever raises concern for HSV encephalitis. Will rule out with LP. Patient in agreement.     ER note reviewed: "presents to the Emergency Department for evaluation of mouth lesions which onset Saturday. Pt reports on Sunday PM she developed a high fever which has persisted until now. The next day, pt reports mouth and lip sores suddenly appeared. Symptoms are constant and moderate in severity. Pt reports sores are worse with certain foods. Associated sxs include difficulty eating secondary to mouth pain, nausea, and spitting yellow blood/sputum. Patient denies any sores to other parts of the body and all other sxs at this time. Patient was prescribed valtrex but only taking twice daily. Pt has also been putting Vaseline on her lips. Pt reports her son recently had similar sxs. Pt has a cat but reports the cat has not scratched her recently. Pt also denies " "any known hx of fever blisters. No further complaints or concerns at this time.     Clinically appears to have herpetic lesions along her lips with aphthous mouth ulcers no concerns for secondary bacterial infection. Patient has not been taking acyclovir correctly as she was given the wrong dosage. She is tolerating po, her mother is with her, also possibly could have hand, foot and mouth but has no other lesions besides orally, there is no indication she needs admission, swabs obtained but will take several days to return. Will tx for HSV, increase acyclovir to correct dose, dicussed OTC Glyoxide antiseptic mouth rinse, along with liquid pain medication, proper hydration discussed along with outpatient follow up and reasons to return given."                Review of Systems:  Constitutional: low grade temps at night   Eyes: no visual changes  ENT: no nasal congestion or sore throat; lip lesions, painful, sputum production  Respiratory: no cough or shortness of breath  Cardiovascular: no chest pain  Gastrointestinal: no nausea or vomiting, no abdominal pain, no constipation, no diarrhea  Genitourinary: no hematuria or dysuria  Musculoskeletal: no arthralgias or myalgias  Skin: no rash  Neurological: intermittent headaches     The following portions of the patient's history were reviewed and updated as appropriate: allergies, current medications, past family history, past medical history, past social history, past surgical history, and problem list.    PAST HISTORY:     Immunization History   Administered Date(s) Administered    DTaP 2000, 03/07/2001, 08/03/2001, 04/14/2003, 10/12/2004    HIB 2000, 03/07/2001, 08/03/2001, 04/14/2003    HPV 9-Valent 12/03/2018    Hepatitis B, Pediatric/Adolescent 2000, 03/07/2001, 08/03/2001, 01/11/2018    IPV 2000, 08/02/2001, 04/14/2003, 10/12/2004    Influenza 01/14/2009    Influenza - Quadrivalent 01/14/2009    Influenza - Quadrivalent - PF *Preferred* (6 " months and older) 11/21/2017    MMR 09/11/2001, 10/12/2004, 01/11/2018    Meningococcal Conjugate (MCV4P) 12/05/2011, 09/12/2017    PPD Test 09/21/2016, 09/19/2017, 12/03/2018, 12/10/2018    Pneumococcal Conjugate - 7 Valent 03/07/2001, 09/11/2001    Tdap 12/05/2011    Varicella 09/11/2001, 01/14/2009, 01/11/2018       Past Medical History:   Diagnosis Date    Abnormal Pap smear of cervix     Anemia     Graves disease     Graves disease     Scoliosis        Past Surgical History:   Procedure Laterality Date    DILATION AND CURETTAGE OF UTERUS  03/19/2018    iron infusion      SCOLIOSIS REPAIR  05/19/2017    THYROID SURGERY      TYMPANOSTOMY TUBE PLACEMENT         No family history on file.    Social History     Socioeconomic History    Marital status: Single   Tobacco Use    Smoking status: Never    Smokeless tobacco: Never   Substance and Sexual Activity    Alcohol use: No    Drug use: No    Sexual activity: Yes     Partners: Male       MEDICATIONS & ALLERGIES:     Current Outpatient Medications on File Prior to Visit   Medication Sig    acetaminophen with codeine (ACETAMINOPHEN-CODEINE) 120mg 12mg 5mL Soln solution Take 10 mLs by mouth every 6 (six) hours as needed (pain).    acyclovir (ZOVIRAX) 400 MG tablet Take 1 tablet (400 mg total) by mouth 3 (three) times daily. for 10 days    albuterol (PROAIR HFA) 90 mcg/actuation inhaler Inhale 2 puffs into the lungs every 4 (four) hours as needed for Shortness of Breath. Rescue    ibuprofen 20 mg/mL oral liquid Take 30 mLs (600 mg total) by mouth every 8 (eight) hours as needed for Pain.    levothyroxine (SYNTHROID) 150 MCG tablet Take 1 tablet (150 mcg total) by mouth before breakfast.    nystatin (MYCOSTATIN) 100,000 unit/mL suspension Take 5 mLs (500,000 Units total) by mouth 4 (four) times daily. for 10 days    sumatriptan (IMITREX) 100 MG tablet TAKE 1 TABLET BY MOUTH AT LEAST 2 HOURS BETWEEN DOSES AS NEEDED ONCE A DAY AS NEEDED    ALPRAZolam (XANAX) 0.25 MG tablet  "Take 0.25 mg by mouth daily as needed.     No current facility-administered medications on file prior to visit.       Review of patient's allergies indicates:   Allergen Reactions    Clindamycin Hives       OBJECTIVE:   Vital Signs:  Vitals:    03/05/25 1152   BP: 96/80   Pulse: 98   SpO2: 100%   Weight: 56.5 kg (124 lb 9 oz)   Height: 5' 6" (1.676 m)       No results found for this or any previous visit (from the past 24 hours).      Physical Exam:   General:  Well developed, well nourished, no acute distress  HEENT:  Normocephalic, atraumatic, EOMI, clear sclera, throat clear without erythema or exudates; photo below   CVS:  RRR, S1 and S2 normal, no murmurs, rubs, gallops  Resp:  Lungs clear to auscultation, no wheezes, rales, rhonchi  GI:  Abdomen soft, non-tender, non-distended, normoactive bowel sounds, no masses  MSK:  No muscle atrophy, peripheral edema, full range of motion  Skin:  No rashes, ulcers, erythema  Psych:  Alert and oriented to person, place, and time        ASSESSMENT:     Mouth lesions/oral herpes   --Confirmed on molecular report  --Complete course of acyclovir   --Expect virus to lay dormant lifelong   --Stressors can trigger recurrent episodes  --If frequent recurrence can consider chronic suppression in future   --Will use PO linezolid 600 mg BID to target Staph/Strep and metronidazole 500 mg BID to target Fusobacterium from molecular report   --Follow up with ID in 4 weeks     Encephalitis  HSV in setting of headache and fever raises suspicion for HSV encephalitis. Will order LP. Measure opening pressure. Send CSF for cell count, protein, glucose, meningitis encephalitis panel, and culture.     Graves disease  Continue current medications and follow up with PCP        PLAN:     Lillie was seen today for mouth lesions.    Diagnoses and all orders for this visit:    Oral herpes simplex infection    Other lesions of oral mucosa    Graves' disease    Encephalitis  -     FL Lumbar Puncture " (xpd); Future  -     Gram stain; Future  -     CSF culture; Future  -     Protein, CSF; Future  -     Glucose, CSF; Future  -     CSF cell count with differential; Future  -     Meningitis - Encephalitis Panel, CSF; Future    Other orders  -     linezolid (ZYVOX) 600 mg Tab; Take 1 tablet (600 mg total) by mouth every 12 (twelve) hours. for 10 days  -     metroNIDAZOLE (FLAGYL) 500 MG tablet; Take 1 tablet (500 mg total) by mouth every 12 (twelve) hours. for 10 days          The total time for evaluation and management services performed on 3/5/25 was greater than 30 minutes.        Barron López, DO   Infectious Diseases

## 2025-03-06 ENCOUNTER — HOSPITAL ENCOUNTER (OUTPATIENT)
Dept: RADIOLOGY | Facility: HOSPITAL | Age: 25
Discharge: HOME | End: 2025-03-06
Attending: STUDENT IN AN ORGANIZED HEALTH CARE EDUCATION/TRAINING PROGRAM
Payer: COMMERCIAL

## 2025-03-06 ENCOUNTER — TELEPHONE (OUTPATIENT)
Dept: INFECTIOUS DISEASES | Facility: CLINIC | Age: 25
End: 2025-03-06
Payer: COMMERCIAL

## 2025-03-06 DIAGNOSIS — G04.90 ENCEPHALITIS: ICD-10-CM

## 2025-03-06 PROCEDURE — 70450 CT HEAD/BRAIN W/O DYE: CPT | Mod: 26,,, | Performed by: RADIOLOGY

## 2025-03-06 PROCEDURE — 70450 CT HEAD/BRAIN W/O DYE: CPT | Mod: TC

## 2025-03-07 NOTE — TELEPHONE ENCOUNTER
Prior authorization approved for ZYVOX.  Payer: [employer] Patient's Payer Case ID: [id number]    [phone]  Note from payer: CaseId:[id number];Status:Approved;Review Type:Prior Auth;Coverage Start Date:02/04/2025;Coverage End Date:04/05/2025;

## (undated) DEVICE — PACK DRAPE PERI/GYN TIBURON

## (undated) DEVICE — SEE MEDLINE ITEM 157027

## (undated) DEVICE — SEE MEDLINE ITEM 152739

## (undated) DEVICE — SEE L#152161

## (undated) DEVICE — GLOVE PROTEXIS HYDROGEL SZ7.5

## (undated) DEVICE — SOL IRR GLY D RX 1.5%

## (undated) DEVICE — SEE MEDLINE ITEM 154981

## (undated) DEVICE — CONTAINER SPECIMEN STRL 4OZ

## (undated) DEVICE — SEE MEDLINE ITEM 152622

## (undated) DEVICE — SET CYSTO IRRIGATION UNIV SPIK

## (undated) DEVICE — SEE MEDLINE ITEM 157117

## (undated) DEVICE — ELECTRODE REM PLYHSV RETURN 9

## (undated) DEVICE — DRESSING TELFA N ADH 3X8

## (undated) DEVICE — GLOVE 8 PROTEXIS PI BLUE

## (undated) DEVICE — CATH URETHRAL 18FR

## (undated) DEVICE — MANIFOLD 4 PORT

## (undated) DEVICE — SEE MEDLINE ITEM 157181